# Patient Record
Sex: FEMALE | Race: WHITE
[De-identification: names, ages, dates, MRNs, and addresses within clinical notes are randomized per-mention and may not be internally consistent; named-entity substitution may affect disease eponyms.]

---

## 2018-05-07 ENCOUNTER — HOSPITAL ENCOUNTER (EMERGENCY)
Dept: HOSPITAL 25 - ED | Age: 83
LOS: 1 days | Discharge: HOME | End: 2018-05-08
Payer: MEDICARE

## 2018-05-07 DIAGNOSIS — Z87.891: ICD-10-CM

## 2018-05-07 DIAGNOSIS — J00: Primary | ICD-10-CM

## 2018-05-07 DIAGNOSIS — R05: ICD-10-CM

## 2018-05-07 DIAGNOSIS — R09.81: ICD-10-CM

## 2018-05-07 DIAGNOSIS — R06.02: ICD-10-CM

## 2018-05-07 PROCEDURE — 93005 ELECTROCARDIOGRAM TRACING: CPT

## 2018-05-07 PROCEDURE — 99283 EMERGENCY DEPT VISIT LOW MDM: CPT

## 2018-05-07 PROCEDURE — 71046 X-RAY EXAM CHEST 2 VIEWS: CPT

## 2018-05-07 PROCEDURE — 85610 PROTHROMBIN TIME: CPT

## 2018-05-07 PROCEDURE — 83880 ASSAY OF NATRIURETIC PEPTIDE: CPT

## 2018-05-07 PROCEDURE — 36415 COLL VENOUS BLD VENIPUNCTURE: CPT

## 2018-05-07 PROCEDURE — 80053 COMPREHEN METABOLIC PANEL: CPT

## 2018-05-07 PROCEDURE — 85025 COMPLETE CBC W/AUTO DIFF WBC: CPT

## 2018-05-07 PROCEDURE — 86140 C-REACTIVE PROTEIN: CPT

## 2018-05-07 PROCEDURE — 87502 INFLUENZA DNA AMP PROBE: CPT

## 2018-05-07 PROCEDURE — 84484 ASSAY OF TROPONIN QUANT: CPT

## 2018-05-07 PROCEDURE — 85730 THROMBOPLASTIN TIME PARTIAL: CPT

## 2018-05-07 PROCEDURE — 87040 BLOOD CULTURE FOR BACTERIA: CPT

## 2018-05-08 VITALS — DIASTOLIC BLOOD PRESSURE: 71 MMHG | SYSTOLIC BLOOD PRESSURE: 148 MMHG

## 2018-05-08 LAB
BASOPHILS # BLD AUTO: 0.1 10^3/UL (ref 0–0.2)
EOSINOPHIL # BLD AUTO: 0.3 10^3/UL (ref 0–0.6)
HCT VFR BLD AUTO: 34 % (ref 35–47)
HGB BLD-MCNC: 10.9 G/DL (ref 12–16)
INR PPP/BLD: 2.44 (ref 0.77–1.02)
LYMPHOCYTES # BLD AUTO: 3.4 10^3/UL (ref 1–4.8)
MCH RBC QN AUTO: 26 PG (ref 27–31)
MCHC RBC AUTO-ENTMCNC: 32 G/DL (ref 31–36)
MCV RBC AUTO: 80 FL (ref 80–97)
MONOCYTES # BLD AUTO: 0.9 10^3/UL (ref 0–0.8)
NEUTROPHILS # BLD AUTO: 4.4 10^3/UL (ref 1.5–7.7)
NRBC # BLD AUTO: 0 10^3/UL
NRBC BLD QL AUTO: 0
PLATELET # BLD AUTO: 243 10^3/UL (ref 150–450)
RBC # BLD AUTO: 4.26 10^6/UL (ref 4–5.4)
WBC # BLD AUTO: 9.1 10^3/UL (ref 3.5–10.8)

## 2018-05-08 NOTE — RAD
INDICATION: Cough and congestion



COMPARISON: Chest x-ray findings January 06, 2016



TECHNIQUE: PA and lateral views of the chest were obtained.



FINDINGS:



The heart and mediastinum are normal in size and contour. There is mild calcified

atherosclerosis overlying the arch of the aorta.



The lungs are grossly clear. There is no evidence of large pleural effusion.



The lateral view radiograph the vertebral bodies appear to be diffusely demineralized.



There is no radiographic evidence of free air beneath the diaphragm



IMPRESSION: 

No radiographic evidence of acute cardiopulmonary disease.

## 2018-05-08 NOTE — ED
Xavier NORWOOD Rebecca, scribed for Zarina Trimble MD on 05/08/18 at 0100 .





Shortness of Breath





- HPI Summary


HPI Summary: 


Pt is an 85 y/o F who presents to ED c/o SOB with congestion and cough. Sx have 

been present for about 2 days. Sx aggravated and alleviated by nothing. Denies 

fever. Reports that her  recently had the flu. Pt received her flu 

vaccination this year. Is on Xarelto. 





- History of Current Complaint


Chief Complaint: EDShortnessOfBreath


Time Seen by Provider: 05/08/18 00:47


Hx Obtained From: Patient


Onset/Duration: Lasting Days - 2 days, Still Present


Dyspnea At: Rest


Aggrevating Factors: Nothing


Alleviating Factors: Nothing


Associated Signs & Symptoms: Cough (Nonproductive), Nasal Congestion





- Allergy/Home Medications


Allergies/Adverse Reactions: 


 Allergies











Allergy/AdvReac Type Severity Reaction Status Date / Time


 


erythromycin base Allergy  Nausea And Verified 05/07/18 23:32





   Vomiting  


 


Sulfa (Sulfonamide Allergy  Nausea And Verified 05/07/18 23:32





Antibiotics)   Vomiting  














PMH/Surg Hx/FS Hx/Imm Hx


Endocrine/Hematology History: 


   Denies: Hx Diabetes, Hx Thyroid Disease, Hx Anemia


Cardiovascular History: Reports: Hx Hypertension, Other Cardiovascular Problems/

Disorders


Respiratory History: 


   Denies: Hx Asthma, Hx Chronic Obstructive Pulmonary Disease (COPD)


GI History: Reports: Hx Gastroesophageal Reflux Disease, Other GI Disorders - 

morning nausea r/t medications


   Denies: Hx Jaundice, Hx Ulcer


Sensory History: Reports: Hx Contacts or Glasses - reading glasse


Opthamlomology History: Reports: Hx Contacts or Glasses - reading glasse


Neurological History: Reports: Hx Transient Ischemic Attacks (TIA) - 2004





- Surgical History


Surgery Procedure, Year, and Place: hysterectomy.  laparoscopic surgeries for 

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  abcessed ovary-removed.  surgery for a fib


Infectious Disease History: No


Infectious Disease History: 


   Denies: Hx Clostridium Difficile, Hx Hepatitis, Hx Human Immunodeficiency 

Virus (HIV), Hx of Known/Suspected MRSA, Hx Shingles, Hx Tuberculosis, Hx Known/

Suspected VRE, Hx Known/Suspected VRSA, History Other Infectious Disease, 

Traveled Outside the [place] in Last 30 Days





- Family History


Known Family History: Positive: Cardiac Disease





- Social History


Alcohol Use: Occasionally


Substance Use Type: Reports: Marijuana


Smoking Status (MU): Former Smoker


Type: Cigarettes


Amount Used/How Often: 40 years ago


Have You Smoked in the Last Year: No





Review of Systems


Negative: Fever


Positive: Other - Congestion


Positive: Shortness Of Breath, Cough


All Other Systems Reviewed And Are Negative: Yes





Physical Exam





- Summary


Physical Exam Summary: 


VITAL SIGNS: Reviewed.


GENERAL: ~Patient is a well-developed and nourished female who is lying 

comfortable in the stretcher. Patient is not in any acute respiratory distress.


HEAD AND FACE: No signs of trauma. No ecchymosis, hematomas or skull 

depressions. No sinus tenderness.


EYES: PERRLA, EOMI x 2, No injected conjunctiva, no nystagmus.


EARS: Hearing grossly intact. Ear canals and tympanic membranes are within 

normal limits.


MOUTH: Oropharynx within normal limits.


NECK: Supple, trachea is midline, no adenopathy, no JVD, no carotid bruit, no c-

spine tenderness, neck with full ROM.


CHEST: Symmetric, no tenderness at palpation


LUNGS: Expiratory wheezes bilaterally. No crackles.


CVS: Regular rate and rhythm, S1 and S2 present, no murmurs or gallops 

appreciated.


EXTREMITIES: FROM in all major joints, no edema, no cyanosis or clubbing.


NEURO: Alert and oriented x 3. No acute neurological deficits. Speech is normal 

and follows commands.


SKIN: Dry and warm


Triage Information Reviewed: Yes


Vital Signs On Initial Exam: 


 Initial Vitals











Temp Pulse Resp BP Pulse Ox


 


 98.4 F   75   20   170/82   97 


 


 05/07/18 23:24  05/07/18 23:24  05/07/18 23:24  05/07/18 23:24  05/07/18 23:24











Vital Signs Reviewed: Yes





Diagnostics





- Vital Signs


 Vital Signs











  Temp Pulse Resp BP Pulse Ox


 


 05/07/18 23:24  98.4 F  75  20  170/82  97














- Laboratory


Result Diagrams: 


 05/08/18 01:15





 05/08/18 01:15


Lab Statement: Any lab studies that have been ordered have been reviewed, and 

results considered in the medical decision making process.





- Radiology


  ** CXR


Xray Interpretation: No Acute Changes - No acute process. Pending official 

report.


Radiology Interpretation Completed By: ED Physician





- EKG


  ** 0126


Cardiac Rate: NL - 75 bpm


EKG Rhythm: Sinus Rhythm


EKG Interpretation: Normal axis. Normal interval. No ischemic changes. 





Re-Evaluation





- Re-Evaluation


  ** First Eval


Re-Evaluation Time: 03:11


Change: Improved


Comment: Pt is feeling better. To auscultation, her lungs are clear.





Course/Dx





- Course


Assessment/Plan: Pt is an 85 y/o F who presents to ED c/o SOB with congestion 

and cough. Sx have been present for about 2 days. Sx aggravated and alleviated 

by nothing. Denies fever. Reports that her  recently had the flu. Pt 

received her flu vaccination this year. Is on Xarelto. CXR reveals no acute 

findings. EKG is sinus rhythm with no ischemic changes. Blood work was done. 

Influenza A and B were negative. In the ED course, pt received a Duoneb. Pt 

will be D/C to home with Dx of common cold.  Allergies noted.





- Diagnoses


Provider Diagnoses: 


 Common cold








Discharge





- Sign-Out/Discharge


Documenting (check all that apply): Discharge/Admit/Transfer - DIscharge





- Discharge Plan


Condition: Stable


Disposition: HOME


Patient Education Materials:  Cold Symptoms (ED)


Referrals: 


Evan Naidu MD [Primary Care Provider] - 3 Days


Additional Instructions: 


RETURN TO EMERGENCY DEPARTMENT FOR ANY NEW OR WORSENING SYMPTOMS. 





The documentation as recorded by the Xavier hamilton Rebecca accurately 

reflects the service I personally performed and the decisions made by me, Zarina Trimble MD.

## 2018-05-14 ENCOUNTER — HOSPITAL ENCOUNTER (INPATIENT)
Dept: HOSPITAL 25 - ED | Age: 83
LOS: 4 days | Discharge: HOME | DRG: 871 | End: 2018-05-18
Attending: INTERNAL MEDICINE | Admitting: HOSPITALIST
Payer: MEDICARE

## 2018-05-14 DIAGNOSIS — E78.5: ICD-10-CM

## 2018-05-14 DIAGNOSIS — Z90.710: ICD-10-CM

## 2018-05-14 DIAGNOSIS — R40.2142: ICD-10-CM

## 2018-05-14 DIAGNOSIS — R79.89: ICD-10-CM

## 2018-05-14 DIAGNOSIS — Z88.2: ICD-10-CM

## 2018-05-14 DIAGNOSIS — F12.90: ICD-10-CM

## 2018-05-14 DIAGNOSIS — K21.9: ICD-10-CM
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
## 2018-05-14 DIAGNOSIS — A41.9: Primary | ICD-10-CM
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
## 2018-05-14 DIAGNOSIS — Z86.73: ICD-10-CM

## 2018-05-14 DIAGNOSIS — I10: ICD-10-CM

## 2018-05-14 DIAGNOSIS — R40.2252: ICD-10-CM

## 2018-05-14 DIAGNOSIS — J18.9: ICD-10-CM

## 2018-05-14 DIAGNOSIS — S00.83XA: ICD-10-CM

## 2018-05-14 DIAGNOSIS — Z79.01: ICD-10-CM

## 2018-05-14 DIAGNOSIS — Z87.891: ICD-10-CM

## 2018-05-14 DIAGNOSIS — Z72.89: ICD-10-CM

## 2018-05-14 DIAGNOSIS — Z88.1: ICD-10-CM

## 2018-05-14 DIAGNOSIS — I48.91: ICD-10-CM

## 2018-05-14 DIAGNOSIS — Z82.49: ICD-10-CM

## 2018-05-14 DIAGNOSIS — R40.2362: ICD-10-CM

## 2018-05-14 LAB
BASOPHILS # BLD AUTO: 0.1 10^3/UL (ref 0–0.2)
EOSINOPHIL # BLD AUTO: 0.1 10^3/UL (ref 0–0.6)
HCT VFR BLD AUTO: 35 % (ref 35–47)
HGB BLD-MCNC: 11.4 G/DL (ref 12–16)
INR PPP/BLD: 2.4 (ref 0.77–1.02)
LYMPHOCYTES # BLD AUTO: 1.9 10^3/UL (ref 1–4.8)
MCH RBC QN AUTO: 25 PG (ref 27–31)
MCHC RBC AUTO-ENTMCNC: 33 G/DL (ref 31–36)
MCV RBC AUTO: 77 FL (ref 80–97)
MONOCYTES # BLD AUTO: 1.2 10^3/UL (ref 0–0.8)
NEUTROPHILS # BLD AUTO: 18.3 10^3/UL (ref 1.5–7.7)
NRBC # BLD AUTO: 0 10^3/UL
NRBC BLD QL AUTO: 0
PLATELET # BLD AUTO: 270 10^3/UL (ref 150–450)
RBC # BLD AUTO: 4.49 10^6/UL (ref 4–5.4)
WBC # BLD AUTO: 21.6 10^3/UL (ref 3.5–10.8)

## 2018-05-14 PROCEDURE — 87502 INFLUENZA DNA AMP PROBE: CPT

## 2018-05-14 PROCEDURE — 71046 X-RAY EXAM CHEST 2 VIEWS: CPT

## 2018-05-14 PROCEDURE — 70450 CT HEAD/BRAIN W/O DYE: CPT

## 2018-05-14 PROCEDURE — 83690 ASSAY OF LIPASE: CPT

## 2018-05-14 PROCEDURE — 85730 THROMBOPLASTIN TIME PARTIAL: CPT

## 2018-05-14 PROCEDURE — 80048 BASIC METABOLIC PNL TOTAL CA: CPT

## 2018-05-14 PROCEDURE — 81015 MICROSCOPIC EXAM OF URINE: CPT

## 2018-05-14 PROCEDURE — 87899 AGENT NOS ASSAY W/OPTIC: CPT

## 2018-05-14 PROCEDURE — 86140 C-REACTIVE PROTEIN: CPT

## 2018-05-14 PROCEDURE — 93005 ELECTROCARDIOGRAM TRACING: CPT

## 2018-05-14 PROCEDURE — 87040 BLOOD CULTURE FOR BACTERIA: CPT

## 2018-05-14 PROCEDURE — 84443 ASSAY THYROID STIM HORMONE: CPT

## 2018-05-14 PROCEDURE — 82553 CREATINE MB FRACTION: CPT

## 2018-05-14 PROCEDURE — 83735 ASSAY OF MAGNESIUM: CPT

## 2018-05-14 PROCEDURE — 81003 URINALYSIS AUTO W/O SCOPE: CPT

## 2018-05-14 PROCEDURE — 99285 EMERGENCY DEPT VISIT HI MDM: CPT

## 2018-05-14 PROCEDURE — 87086 URINE CULTURE/COLONY COUNT: CPT

## 2018-05-14 PROCEDURE — 84484 ASSAY OF TROPONIN QUANT: CPT

## 2018-05-14 PROCEDURE — 71045 X-RAY EXAM CHEST 1 VIEW: CPT

## 2018-05-14 PROCEDURE — 82550 ASSAY OF CK (CPK): CPT

## 2018-05-14 PROCEDURE — 84145 PROCALCITONIN (PCT): CPT

## 2018-05-14 PROCEDURE — 87205 SMEAR GRAM STAIN: CPT

## 2018-05-14 PROCEDURE — 83880 ASSAY OF NATRIURETIC PEPTIDE: CPT

## 2018-05-14 PROCEDURE — 36415 COLL VENOUS BLD VENIPUNCTURE: CPT

## 2018-05-14 PROCEDURE — 80053 COMPREHEN METABOLIC PANEL: CPT

## 2018-05-14 PROCEDURE — 83605 ASSAY OF LACTIC ACID: CPT

## 2018-05-14 PROCEDURE — 85610 PROTHROMBIN TIME: CPT

## 2018-05-14 PROCEDURE — 85025 COMPLETE CBC W/AUTO DIFF WBC: CPT

## 2018-05-14 PROCEDURE — 87070 CULTURE OTHR SPECIMN AEROBIC: CPT

## 2018-05-14 RX ADMIN — ACETAMINOPHEN PRN MG: 325 TABLET ORAL at 15:38

## 2018-05-14 RX ADMIN — SODIUM CHLORIDE SCH MLS/HR: 900 IRRIGANT IRRIGATION at 16:21

## 2018-05-14 RX ADMIN — DOCUSATE SODIUM SCH MG: 100 CAPSULE, LIQUID FILLED ORAL at 21:38

## 2018-05-14 RX ADMIN — GUAIFENESIN SCH MG: 600 TABLET, EXTENDED RELEASE ORAL at 21:39

## 2018-05-14 RX ADMIN — SODIUM CHLORIDE SCH MLS/HR: 900 IRRIGANT IRRIGATION at 19:14

## 2018-05-14 RX ADMIN — LOSARTAN POTASSIUM SCH MG: 25 TABLET, FILM COATED ORAL at 21:39

## 2018-05-14 RX ADMIN — RIVAROXABAN SCH MG: 20 TABLET, FILM COATED ORAL at 21:38

## 2018-05-14 NOTE — HP
CC:  Dr. Evan Naidu *

 

ADMISSION HISTORY AND PHYSICAL:

 

DATE OF ADMISSION:  05/14/18

 

ATTENDING HOSPITALIST:  Aicha Mejia DO * (DICTATED BY JACINTO COCHRAN)

 

PRIMARY CARE PHYSICIAN:  Evan Naidu MD

 

CHIEF COMPLAINT:  Shortness of breath.

 

HISTORY OF PRESENT ILLNESS:  Ms. Arroyo is a pleasant 86-year-old female, who 
presented to the emergency room today with complaints of progressive cough and 
shortness of breath for the past 7 days.  The patient has history of 
hypertension, hyperlipidemia, remote history of stroke as well as history of 
atrial fibrillation for which she has been on Xarelto and she is status post 
ablation about 6 years ago.  She was at her usual state of health until about a 
week ago when she started to feel progressive cough and shortness of breath.  
She went to see her primary care physician last Thursday and her exam, as she 
was told, was normal.  She returned home and continued to have progressive 
cough and shortness of breath with some productive sputum.  She unfortunately 
got a little dizzy and fell at home on Saturday night hitting her head and 
reports loss of consciousness for an undetermined period of time.  The patient, 
however, was able to get up and denied any headache, dizziness, weakness, or 
numbness since then.  She continued to have worsening shortness of breath for 
which she finally presented to the emergency room this morning for evaluation.  
She denies any chest pain, dizziness, or weakness today.  She had laboratory 
workup that revealed significant leukocytosis with white count of 21,000 as 
well as a chemistry panel showing elevated C-reactive protein of 246.  The 
patient had CT scan of the brain that revealed no evidence of hemorrhage and 
she had a chest x-ray that was consistent with pneumonia.  The patient herself 
denies any fever, but she notes some chills in the past couple of days.  She 
denies any wheezing or any history of COPD.  Given her presentation and the 
findings of the chest x-ray and laboratory workup, we were asked to see the 
patient for further evaluation of community-acquired pneumonia.

 

PAST MEDICAL HISTORY:  Significant for hypertension, hyperlipidemia, history of 
stroke about 6 years ago, history of atrial fibrillation for which she has been 
on Xarelto and also had ablation about 6 years ago.

 

PAST SURGICAL HISTORY:  Significant for fertility surgery x2 as well as ovarian 
abscess drainage in the remote past.

 

CURRENT MEDICATIONS:  Her medications at home include only:

1.  Losartan 50 mg p.o. b.i.d.

2.  Xarelto 20 mg p.o. daily.

 

ALLERGIES:  She is allergic to ERYTHROMYCIN BASE and SULFONAMIDE ANTIBIOTICS.

 

FAMILY HISTORY:  Reviewed and noncontributory.

 

SOCIAL HISTORY:  The patient denies smoking.  She drinks a half glass to a 
glass of wine on a nightly basis.  She smokes marijuana occasionally.  She 
lives with her , Jacob, who is the healthcare proxy carrier.  She still 
enjoys painting and being active outdoors.

 

REVIEW OF SYSTEMS:  As above.  Otherwise, all 14 systems reviewed and they were 
all negative.

 

                               PHYSICAL EXAMINATION

 

GENERAL:  She is a pleasant, elderly female, in no acute distress or discomfort 
at the time of admission.

 

VITAL SIGNS:  Most recent set of vitals, blood pressure of 120/56, pulse of 89, 
temperature of 98.6, and O2 sat of 95% on room air.

 

HEENT:  Head exam revealed an area of ecchymosis at the right frontal part that 
was nontender on palpation.  There is very minimal area of ecchymosis next to 
the nasal septum between the nasolacrimal fold and upper lid.  There was no 
tenderness or any evidence of swelling either.  Sclerae anicteric.  PERRLA, 
EOMs intact.  Oropharynx is pink, moist with no exudate.

 

LUNGS:  There are scattered expiratory rhonchi and wheezes noted, more profound 
at the bases.  There is no chest wall retraction or accessory muscle use.

 

HEART:  Regular rate and rhythm.  Normal S1 and S2 without rubs, murmurs, or 
gallops.

 

BACK:  With normal curvature.  No CVA tenderness.

 

BREASTS:  Exam deferred at this time.

 

ABDOMEN:  Soft, nontender, and nondistended.  No hernias, masses, or 
hepatosplenomegaly.

 

EXTREMITIES:  Without cyanosis, clubbing, or edema.

 

NEUROLOGIC:  Grossly intact.

 

RECTAL:  Exam deferred at this time.

 

 LABORATORY DATA:  The patient had CBC today that revealed elevated white count 
that was 21,600, hemoglobin of 11.4, hematocrit 35, and platelets 270.  Her INR 
was 2.4.  Chemistry panel:  Sodium of 130, potassium 4.4, chloride 97, CO2 of 21
, BUN 18, and creatinine of 0.8.  Her total bilirubin slightly elevated at 1.1.
  C- reactive protein elevated with value of 246.  .  Lipase less than 
10.  She had rapid influenza A and B antigen that were both negative and urine 
legionella and Strep pneumo antigens are pending at this time.

 

ACCESSORY DIAGNOSTIC DATA:  Chest x-ray this morning was consistent with right 
basilar infiltrate and pneumonia.

 

The patient also had brain CT given her history of fall with head injury.  
There was no acute intracranial pathology noted.

 

IMPRESSION:  An 86-year-old female with past medical history of hypertension, 
hyperlipidemia, cerebrovascular accident, and atrial fibrillation for which she 
has been on Xarelto, who presented to the emergency room with 1 week history of 
progressively worsening cough and shortness of breath, who was found to have 
community-acquired pneumonia.

 

ASSESSMENT AND PLAN:

1.  Sepsis.  The patient has leukocytosis greater than 12,000 as well as C-
reactive protein greater than 150 combined with tachypnea and she meets the 
criteria for sepsis for which she will be admitted for IV hydration and IV 
antibiotics.  We will keep her in the telemetry unit with supportive care and 
supplemental oxygen.  We will initiate antibiotic therapy using a quinolone at 
this time.  We reviewed the benefit of steroid therapy in her case and given 
her age, it was felt that there are more risks involved to initiate steroid 
therapy and for this time, we will continue antibiotic alone.

2.  Syncope and head injury, likely secondary to weakness given her current 
presentation with pneumonia.  There was no evidence of neurologic deficits and 
her CT scan of the head showed no evidence of hematoma either.  We will 
continue her Xarelto given her history of atrial fibrillation.

3.  Atrial fibrillation.  Has been stable and we will continue Xarelto.

4.  Hypertension, controlled.  We will continue her losartan.

5.  Elevated BNP.  In the setting of sepsis, we will not proceed with any 
diuretics at this time.

6.  Marijuana use.  The patient is aware of the risks and benefits involved and 
she would like to continue using it on a recreational basis.

7.  DVT prophylaxis.  The patient on Xarelto.

8.  Code status.  She is a full code.

 

TIME SPENT:  Total time spent admitting this patient was approximately 60 
minutes for which greater than 50% was spent fact-to-face care.

 

 ____________________________________ JACINTO COCHRAN

 

[id number]/CPS #: [id number]

RILEY

## 2018-05-14 NOTE — ED
Gustavo NORWOOD Thomas, scribed for Leroy Carroll MD on 05/14/18 at 1050 .





Complex/Multi-Sys Presentation





- HPI Summary


HPI Summary: 





The patient is an 86 year old female who had a fall five days ago after a 

syncopal episode with positive loss of consciousness. She went to the ED after 

that fall and she went to her primary care provider three days ago. The last 

two days, the patient developed generalized weakness, fevers, and a productive 

cough. She reports worsening of her breathing when lying down. The patient 

denies nausea and focal weakness or numbness. 





- History Of Current Complaint


Chief Complaint: EDGeneral


Time Seen by Provider: 05/14/18 10:38


Hx Obtained From: Patient


Onset/Duration: Still Present


Timing: Constant


Severity Currently: Moderate


Location: Negative


Aggravating Factor(s): Lying down


Alleviating Factor(s): None


Associated Signs And Symptoms: Positive: Other - Syncope, LOC, weakness, fevers

, cough





- Allergies/Home Medications


Allergies/Adverse Reactions: 


 Allergies











Allergy/AdvReac Type Severity Reaction Status Date / Time


 


erythromycin base Allergy  Nausea And Verified 05/07/18 23:32





   Vomiting  


 


Sulfa (Sulfonamide Allergy  Nausea And Verified 05/07/18 23:32





Antibiotics)   Vomiting  














PMH/Surg Hx/FS Hx/Imm Hx


Endocrine/Hematology History: 


   Denies: Hx Diabetes, Hx Thyroid Disease, Hx Anemia


Cardiovascular History: Reports: Hx Hypertension, Other Cardiovascular Problems/

Disorders


Respiratory History: 


   Denies: Hx Asthma, Hx Chronic Obstructive Pulmonary Disease (COPD)


GI History: Reports: Hx Gastroesophageal Reflux Disease, Other GI Disorders - 

morning nausea r/t medications


   Denies: Hx Jaundice, Hx Ulcer


Sensory History: Reports: Hx Contacts or Glasses - reading glasse


Opthamlomology History: Reports: Hx Contacts or Glasses - reading glasse


Neurological History: Reports: Hx Transient Ischemic Attacks (TIA) - 2004





- Surgical History


Surgery Procedure, Year, and Place: hysterectomy.  laparoscopic surgeries for 

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  abcessed ovary-removed.  surgery for a fib


Infectious Disease History: No


Infectious Disease History: 


   Denies: Hx Clostridium Difficile, Hx Hepatitis, Hx Human Immunodeficiency 

Virus (HIV), Hx of Known/Suspected MRSA, Hx Shingles, Hx Tuberculosis, Hx Known/

Suspected VRE, Hx Known/Suspected VRSA, History Other Infectious Disease, 

Traveled Outside the [place] in Last 30 Days





- Family History


Known Family History: Positive: Cardiac Disease





- Social History


Alcohol Use: Occasionally


Substance Use Type: Reports: Marijuana


Substance Use Comment - Amount & Last Used: wknd


Smoking Status (MU): Former Smoker


Type: Cigarettes


Amount Used/How Often: 40 years ago


Have You Smoked in the Last Year: No





Review of Systems


Positive: Fever


Positive: Cough


Neurological: Other - Syncope, LOC


Positive: Weakness - generalized.  Negative: Numbness


All Other Systems Reviewed And Are Negative: Yes





Physical Exam





- Summary


Physical Exam Summary: 





General: well-appearing, no pain distress


Skin: warm, color reflects adequate perfusion, dry


Head: She has swelling to her right forehead. 


Eyes: EOMI, YOSI


ENT: Dry oral mucosa


Neck: supple, nontender


Respiratory: Bilateral wheezing and rhonchi, minimal respiratory distress. 


Cardiovascular: Tachycardia, regular rhythm. 


Abdomen: soft, nontender


Bowel: present


Musculoskeletal: Normal, strength/ROM intact. There is not any edema. 


Neurological: normal, sensory/motor intact, A&O x3


Psychological: affect/mood appropriate


Triage Information Reviewed: Yes


Vital Signs On Initial Exam: 


 Initial Vitals











Temp Pulse Resp BP Pulse Ox


 


 98.6 F   116   25   147/85   91 


 


 05/14/18 10:22  05/14/18 10:22  05/14/18 10:22  05/14/18 10:22  05/14/18 10:22











Vital Signs Reviewed: Yes





- Fabrice Coma Scale


Best Eye Response: 4 - Spontaneous


Best Motor Response: 6 - Obeys Commands


Best Verbal Response: 5 - Oriented


Coma Scale Total: 15





Diagnostics





- Vital Signs


 Vital Signs











  Temp Pulse Resp BP Pulse Ox


 


 05/14/18 10:27   114  22  147/85  91


 


 05/14/18 10:22  98.6 F  116  25  147/85  91














- Laboratory


Lab Results: 


 Lab Results











  05/14/18 05/14/18 05/14/18 Range/Units





  10:49 10:49 10:49 


 


WBC  21.6 H    (3.5-10.8)  10^3/ul


 


RBC  4.49    (4.0-5.4)  10^6/ul


 


Hgb  11.4 L    (12.0-16.0)  g/dl


 


Hct  35    (35-47)  %


 


MCV  77 L    (80-97)  fL


 


MCH  25 L    (27-31)  pg


 


MCHC  33    (31-36)  g/dl


 


RDW  17 H    (10.5-15)  %


 


Plt Count  270    (150-450)  10^3/ul


 


MPV  9.0    (7.4-10.4)  um3


 


Neut % (Auto)  Pending    


 


Lymph % (Auto)  Pending    


 


Mono % (Auto)  Pending    


 


Eos % (Auto)  Pending    


 


Baso % (Auto)  Pending    


 


Absolute Neuts (auto)  Pending    


 


Absolute Lymphs (auto)  Pending    


 


Absolute Monos (auto)  Pending    


 


Absolute Eos (auto)  Pending    


 


Absolute Basos (auto)  Pending    


 


Absolute Nucleated RBC  Pending    


 


Nucleated RBC %  Pending    


 


INR (Anticoag Therapy)   2.40 H   (0.77-1.02)  


 


APTT   32.6   (26.0-36.3)  seconds


 


Sodium    130 L  (139-145)  mmol/L


 


Potassium    4.4  (3.5-5.0)  mmol/L


 


Chloride    97 L  (101-111)  mmol/L


 


Carbon Dioxide    21 L  (22-32)  mmol/L


 


Anion Gap    12 H  (2-11)  mmol/L


 


BUN    18  (6-24)  mg/dL


 


Creatinine    0.81  (0.51-0.95)  mg/dL


 


Est GFR ( Amer)    86.2  (>60)  


 


Est GFR (Non-Af Amer)    67.0  (>60)  


 


BUN/Creatinine Ratio    22.2 H  (8-20)  


 


Glucose    137 H  ()  mg/dL


 


Lactic Acid     (0.5-2.0)  mmol/L


 


Calcium    9.4  (8.6-10.3)  mg/dL


 


Magnesium    2.0  (1.9-2.7)  mg/dL


 


Total Bilirubin    1.10 H  (0.2-1.0)  mg/dL


 


AST    50 H  (13-39)  U/L


 


ALT    61 H  (7-52)  U/L


 


Alkaline Phosphatase    139 H  ()  U/L


 


Total Creatine Kinase    28  ()  U/L


 


CK-MB (CK-2)    1.0  (0.6-6.3)  ng/mL


 


Troponin I    0.02  (<0.04)  ng/mL


 


C-Reactive Protein    246.42 H  (< 5.00)  mg/L


 


B-Natriuretic Peptide    ( - 100) pg/mL


 


Total Protein    6.9  (6.4-8.9)  g/dL


 


Albumin    3.8  (3.2-5.2)  g/dL


 


Globulin    3.1  (2-4)  g/dL


 


Albumin/Globulin Ratio    1.2  (1-3)  


 


Lipase    < 10 L  (11.0-82.0)  U/L


 


TSH    2.05  (0.34-5.60)  mcIU/mL














  05/14/18 05/14/18 Range/Units





  10:49 10:49 


 


WBC    (3.5-10.8)  10^3/ul


 


RBC    (4.0-5.4)  10^6/ul


 


Hgb    (12.0-16.0)  g/dl


 


Hct    (35-47)  %


 


MCV    (80-97)  fL


 


MCH    (27-31)  pg


 


MCHC    (31-36)  g/dl


 


RDW    (10.5-15)  %


 


Plt Count    (150-450)  10^3/ul


 


MPV    (7.4-10.4)  um3


 


Neut % (Auto)    


 


Lymph % (Auto)    


 


Mono % (Auto)    


 


Eos % (Auto)    


 


Baso % (Auto)    


 


Absolute Neuts (auto)    


 


Absolute Lymphs (auto)    


 


Absolute Monos (auto)    


 


Absolute Eos (auto)    


 


Absolute Basos (auto)    


 


Absolute Nucleated RBC    


 


Nucleated RBC %    


 


INR (Anticoag Therapy)    (0.77-1.02)  


 


APTT    (26.0-36.3)  seconds


 


Sodium    (139-145)  mmol/L


 


Potassium    (3.5-5.0)  mmol/L


 


Chloride    (101-111)  mmol/L


 


Carbon Dioxide    (22-32)  mmol/L


 


Anion Gap    (2-11)  mmol/L


 


BUN    (6-24)  mg/dL


 


Creatinine    (0.51-0.95)  mg/dL


 


Est GFR ( Amer)    (>60)  


 


Est GFR (Non-Af Amer)    (>60)  


 


BUN/Creatinine Ratio    (8-20)  


 


Glucose    ()  mg/dL


 


Lactic Acid  0.5   (0.5-2.0)  mmol/L


 


Calcium    (8.6-10.3)  mg/dL


 


Magnesium    (1.9-2.7)  mg/dL


 


Total Bilirubin    (0.2-1.0)  mg/dL


 


AST    (13-39)  U/L


 


ALT    (7-52)  U/L


 


Alkaline Phosphatase    ()  U/L


 


Total Creatine Kinase    ()  U/L


 


CK-MB (CK-2)    (0.6-6.3)  ng/mL


 


Troponin I    (<0.04)  ng/mL


 


C-Reactive Protein    (< 5.00)  mg/L


 


B-Natriuretic Peptide   332 H ( - 100) pg/mL


 


Total Protein    (6.4-8.9)  g/dL


 


Albumin    (3.2-5.2)  g/dL


 


Globulin    (2-4)  g/dL


 


Albumin/Globulin Ratio    (1-3)  


 


Lipase    (11.0-82.0)  U/L


 


TSH    (0.34-5.60)  mcIU/mL











Result Diagrams: 


 05/14/18 10:49





 05/14/18 10:49


Lab Statement: Any lab studies that have been ordered have been reviewed, and 

results considered in the medical decision making process.





- Radiology


  ** CXR


Xray Interpretation: No Acute Changes - IMPRESSION: SUGGESTION OF RIGHT BASILAR 

INFILTRATE AND PNEUMONIA. Dr. Carroll has reviewed this report.


Radiology Interpretation Completed By: Radiologist





- CT


  ** CT Brain


CT Interpretation: No Acute Changes - IMPRESSION:  1.  NO ACUTE INTRACRANIAL 

PATHOLOGY. 2.  CHRONIC SMALL VESSEL ISCHEMIC CHANGE. 3.  RIGHT MASTOID 

EFFUSION. Dr. Carroll has reviewed this report.


CT Interpretation Completed By: Radiologist





- EKG


  ** 10:32


Cardiac Rate: Tachycardia


EKG Interpretation: Rapid A-Fib at 111 BPM. Borderline T abnormalities, 

inferior leads. 





Complex Multi-Symp Course/Dx


Course Of Treatment: DISCUSSED WITH DR BURKS.  ADMIT HOSPITALIST.  CRITICAL CARE 

TIME LESS THGAN 30 MINUTES.


Assessment/Plan: Medications reviewed. Allergies noted. BP noted and patient 

urged to follow up with primary care.





- Diagnoses


Provider Diagnoses: 


 HTN (hypertension), Pneumonia, Head injury








- Physician Notifications


Discussed Care Of Patient With: Aicha Mejia


Time Discussed With Above Provider: 12:36


Instructed by Provider To: Admit As Inpatient





Discharge





- Sign-Out/Discharge


Documenting (check all that apply): Discharge/Admit/Transfer





- Discharge Plan


Condition: Fair


Disposition: ADMITTED TO [facility]


Referrals: 


Evan Burks MD [Primary Care Provider] - 





- Billing Disposition and Condition


Condition: FAIR


Disposition: [facility]





The documentation as recorded by the Gustavo hamilton Thomas accurately reflects 

the service I personally performed and the decisions made by me, Leroy Carroll MD.

## 2018-05-14 NOTE — RAD
Indication: Weakness.



Single frontal view of the chest performed at 1040 hours was reviewed.



Comparison is made with previous exam dated May 08, 2018.



No mediastinal shift is noted. Airspace disease in the right base not present on previous

exam is present. There is some atelectasis in the left midlung field as well.



IMPRESSION: SUGGESTION OF RIGHT BASILAR INFILTRATE AND PNEUMONIA.

## 2018-05-14 NOTE — RAD
HISTORY: Subacute trauma, anticoagulation



COMPARISONS: September 21, 2015



TECHNIQUE: Multiple contiguous axial CT scans were obtained of the head without 

intravenous contrast. 



FINDINGS: 

HEMORRHAGE/INFARCT: There is no hemorrhage or acute infarct.

MASSES/SHIFT: There is no mass or shift.



EXTRA-AXIAL SPACES: There are no extra-axial fluid collections.

SULCI AND VENTRICLES: The sulci and ventricles are normal in size and position for the

patient's stated age.



CEREBRUM: There is hypoattenuation of the periventricular and subcortical white matter.

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

BRAINSTEM: There are no focal parenchymal abnormalities.

CEREBELLUM: There are no focal parenchymal abnormalities.



VESSELS: There is calcification of the cavernous segments of the internal carotid arteries

bilaterally.

PARANASAL SINUSES: There is mucosal thickening of the ethmoid air cells. There is a right

mastoid effusion.

ORBITS: Senile calcifications are noted.

BONES AND SOFT TISSUE: No bone or soft tissue abnormalities are noted.



OTHER: None



IMPRESSION: 

1.  NO ACUTE INTRACRANIAL PATHOLOGY.

2.  CHRONIC SMALL VESSEL ISCHEMIC CHANGE.

3.  RIGHT MASTOID EFFUSION.

## 2018-05-15 LAB
BASOPHILS # BLD AUTO: 0 10^3/UL (ref 0–0.2)
EOSINOPHIL # BLD AUTO: 0 10^3/UL (ref 0–0.6)
HCT VFR BLD AUTO: 29 % (ref 35–47)
HGB BLD-MCNC: 9.3 G/DL (ref 12–16)
LYMPHOCYTES # BLD AUTO: 2.1 10^3/UL (ref 1–4.8)
MCH RBC QN AUTO: 26 PG (ref 27–31)
MCHC RBC AUTO-ENTMCNC: 33 G/DL (ref 31–36)
MCV RBC AUTO: 79 FL (ref 80–97)
MONOCYTES # BLD AUTO: 1.1 10^3/UL (ref 0–0.8)
NEUTROPHILS # BLD AUTO: 15 10^3/UL (ref 1.5–7.7)
NRBC # BLD AUTO: 0 10^3/UL
NRBC BLD QL AUTO: 0
PLATELET # BLD AUTO: 222 10^3/UL (ref 150–450)
RBC # BLD AUTO: 3.6 10^6/UL (ref 4–5.4)
WBC # BLD AUTO: 18.3 10^3/UL (ref 3.5–10.8)

## 2018-05-15 RX ADMIN — GUAIFENESIN SCH MG: 600 TABLET, EXTENDED RELEASE ORAL at 21:03

## 2018-05-15 RX ADMIN — GUAIFENESIN SCH MG: 600 TABLET, EXTENDED RELEASE ORAL at 08:38

## 2018-05-15 RX ADMIN — RIVAROXABAN SCH MG: 20 TABLET, FILM COATED ORAL at 08:38

## 2018-05-15 RX ADMIN — LOSARTAN POTASSIUM SCH MG: 25 TABLET, FILM COATED ORAL at 08:38

## 2018-05-15 RX ADMIN — LOSARTAN POTASSIUM SCH MG: 25 TABLET, FILM COATED ORAL at 21:05

## 2018-05-15 RX ADMIN — DOCUSATE SODIUM SCH MG: 100 CAPSULE, LIQUID FILLED ORAL at 08:38

## 2018-05-15 RX ADMIN — DOCUSATE SODIUM SCH: 100 CAPSULE, LIQUID FILLED ORAL at 21:05

## 2018-05-15 RX ADMIN — LEVOFLOXACIN SCH MLS/HR: 5 INJECTION, SOLUTION INTRAVENOUS at 11:56

## 2018-05-15 NOTE — RAD
INDICATION: Follow-up right-sided pneumonia



COMPARISON: Most recent comparison chest x-ray is dated May 14, 2018



TECHNIQUE: PA and lateral views of the chest were obtained.



FINDINGS:



The heart and mediastinum are normal in size and contour.



There is right lung base costophrenic angle blunting which is depicted better on the

lateral view chest x-ray. Again seen is questionable density overlying the right lower

lung but this is not well reproduced on the lateral view chest x-ray. More superiorly and

in the left lung the lungs are adequately aerated.



Visualized bones are normal for the patient's age.



There is no radiographic evidence of free air beneath the diaphragm



IMPRESSION: 

LIKELY LEFT-SIDED PLEURAL EFFUSION POSSIBLY WITH A SMALL AMOUNT OF ADJACENT COMPRESSIVE

ATELECTASIS. THERE IS NO DEFINITE LOBAR CONSOLIDATION.

## 2018-05-15 NOTE — PN
Subjective


Date of Service: 05/15/18


Interval History: 





Mrs. masters reports doing much better today. She is breathing better, 

although still has occasional wheezing. Fever had subsided since yesterday 

evening. Appetite is better, denies nausea or vomiting. No chest pain or 

palpitations. Seen earlier today by , her PCP. No new complaints.








Family History: Unchanged from Admission


Social History: Unchanged from Admission


Past Medical History: Unchanged from Admission





Objective


Active Medications: 








Acetaminophen (Tylenol Tab*)  650 mg PO Q4H PRN


   PRN Reason: FEVER/PAIN


   Last Admin: 18 15:38 Dose:  650 mg


Al Hydrox/Mg Hydrox/Simethicone (Maalox Plus*)  30 ml PO Q6H PRN


   PRN Reason: INDIGESTION


Albuterol (Ventolin 2.5 Mg/3 Ml Neb.Sol*)  2.5 mg INH [id number] AWAKE PRN


   PRN Reason: sob/wheezing


Docusate Sodium (Colace Cap*)  100 mg PO BID [facility]


   Last Admin: 05/15/18 08:38 Dose:  100 mg


Guaifenesin (Mucinex*)  600 mg PO BID [facility]


   Last Admin: 05/15/18 08:38 Dose:  600 mg


Levofloxacin/Dextrose (Levaquin 250 Mg Ivpremx(*))  250 mg in 50 mls @ 50 mls/

hr IVPB Q24H [facility]


   Last Admin: 05/15/18 11:56 Dose:  50 mls/hr


Losartan Potassium (Cozaar Tab*)  50 mg PO BID [facility]


   Last Admin: 05/15/18 08:38 Dose:  50 mg


Magnesium Hydroxide (Milk Of Magnesia Liq*)  30 ml PO Q4H PRN


   PRN Reason: CONSTIPATION


Ondansetron HCl (Zofran Inj*)  4 mg IV Q4H PRN


   PRN Reason: NAUSEA/VOMITING


Oxycodone/Acetaminophen (Percocet 5/325 Tab*)  1 tab PO Q4H PRN


   PRN Reason: Pain


Rivaroxaban (Xarelto(*))  20 mg PO DAILY [facility]


   Last Admin: 05/15/18 08:38 Dose:  20 mg








 Vital Signs - 8 hr











  05/15/18





  11:24


 


Temperature 98.3 F


 


Pulse Rate 85


 


Respiratory 24





Rate 


 


Blood Pressure 125/62





(mmHg) 


 


O2 Sat by Pulse 97





Oximetry 











Oxygen Devices in Use Now: Nasal Cannula


Appearance: Appears comfortable, finished eating her dinner, in NAD


Eyes: No Scleral Icterus, PERRLA


Ears/Nose/Mouth/Throat: Clear Oropharnyx, Mucous Membranes Moist


Neck: NL Appearance and Movements; NL JVP, Trachea Midline


Respiratory: Symmetrical Chest Expansion and Respiratory Effort, - - Lungs with 

scattered wheezes noted. Bibasilar rhonchi. Mild accessory muscle use.


Cardiovascular: NL Sounds; No Murmurs; No JVD, - - Irrigularly irrigular rythem


Abdominal: NL Sounds; No Tenderness; No Distention


Lymphatic: No Cervical Adenopathy


Extremities: No Edema


Skin: No Rash or Ulcers


Neurological: Alert and Oriented x 3, NL Sensation, NL Muscle Strength and Tone


Lines/Tubes/Other Access: Clean, Dry and Intact Peripheral IV - IVF stopped


Nutrition: Taking PO's


Result Diagrams: 


 05/15/18 04:53





 05/15/18 04:53


Additional Lab and Data: 


 Lab Results











  18 Range/Units





  10:49 10:49 10:49 


 


WBC  21.6 H    (3.5-10.8)  10^3/ul


 


RBC  4.49    (4.0-5.4)  10^6/ul


 


Hgb  11.4 L    (12.0-16.0)  g/dl


 


Hct  35    (35-47)  %


 


MCV  77 L    (80-97)  fL


 


MCH  25 L    (27-31)  pg


 


MCHC  33    (31-36)  g/dl


 


RDW  17 H    (10.5-15)  %


 


Plt Count  270    (150-450)  10^3/ul


 


MPV  9.0    (7.4-10.4)  um3


 


Neut % (Auto)  Pending    


 


Lymph % (Auto)  Pending    


 


Mono % (Auto)  Pending    


 


Eos % (Auto)  Pending    


 


Baso % (Auto)  Pending    


 


Absolute Neuts (auto)  Pending    


 


Absolute Lymphs (auto)  Pending    


 


Absolute Monos (auto)  Pending    


 


Absolute Eos (auto)  Pending    


 


Absolute Basos (auto)  Pending    


 


Absolute Nucleated RBC  Pending    


 


Nucleated RBC %  Pending    


 


INR (Anticoag Therapy)   2.40 H   (0.77-1.02)  


 


APTT   32.6   (26.0-36.3)  seconds


 


Sodium    130 L  (139-145)  mmol/L


 


Potassium    4.4  (3.5-5.0)  mmol/L


 


Chloride    97 L  (101-111)  mmol/L


 


Carbon Dioxide    21 L  (22-32)  mmol/L


 


Anion Gap    12 H  (2-11)  mmol/L


 


BUN    18  (6-24)  mg/dL


 


Creatinine    0.81  (0.51-0.95)  mg/dL


 


Est GFR ( Amer)    86.2  (>60)  


 


Est GFR (Non-Af Amer)    67.0  (>60)  


 


BUN/Creatinine Ratio    22.2 H  (8-20)  


 


Glucose    137 H  ()  mg/dL


 


Lactic Acid     (0.5-2.0)  mmol/L


 


Calcium    9.4  (8.6-10.3)  mg/dL


 


Magnesium    2.0  (1.9-2.7)  mg/dL


 


Total Bilirubin    1.10 H  (0.2-1.0)  mg/dL


 


AST    50 H  (13-39)  U/L


 


ALT    61 H  (7-52)  U/L


 


Alkaline Phosphatase    139 H  ()  U/L


 


Total Creatine Kinase    28  ()  U/L


 


CK-MB (CK-2)    1.0  (0.6-6.3)  ng/mL


 


Troponin I    0.02  (<0.04)  ng/mL


 


C-Reactive Protein    246.42 H  (< 5.00)  mg/L


 


B-Natriuretic Peptide    ( - 100) pg/mL


 


Total Protein    6.9  (6.4-8.9)  g/dL


 


Albumin    3.8  (3.2-5.2)  g/dL


 


Globulin    3.1  (2-4)  g/dL


 


Albumin/Globulin Ratio    1.2  (1-3)  


 


Lipase    < 10 L  (11.0-82.0)  U/L


 


TSH    2.05  (0.34-5.60)  mcIU/mL














  18 Range/Units





  10:49 10:49 


 


WBC    (3.5-10.8)  10^3/ul


 


RBC    (4.0-5.4)  10^6/ul


 


Hgb    (12.0-16.0)  g/dl


 


Hct    (35-47)  %


 


MCV    (80-97)  fL


 


MCH    (27-31)  pg


 


MCHC    (31-36)  g/dl


 


RDW    (10.5-15)  %


 


Plt Count    (150-450)  10^3/ul


 


MPV    (7.4-10.4)  um3


 


Neut % (Auto)    


 


Lymph % (Auto)    


 


Mono % (Auto)    


 


Eos % (Auto)    


 


Baso % (Auto)    


 


Absolute Neuts (auto)    


 


Absolute Lymphs (auto)    


 


Absolute Monos (auto)    


 


Absolute Eos (auto)    


 


Absolute Basos (auto)    


 


Absolute Nucleated RBC    


 


Nucleated RBC %    


 


INR (Anticoag Therapy)    (0.77-1.02)  


 


APTT    (26.0-36.3)  seconds


 


Sodium    (139-145)  mmol/L


 


Potassium    (3.5-5.0)  mmol/L


 


Chloride    (101-111)  mmol/L


 


Carbon Dioxide    (22-32)  mmol/L


 


Anion Gap    (2-11)  mmol/L


 


BUN    (6-24)  mg/dL


 


Creatinine    (0.51-0.95)  mg/dL


 


Est GFR ( Amer)    (>60)  


 


Est GFR (Non-Af Amer)    (>60)  


 


BUN/Creatinine Ratio    (8-20)  


 


Glucose    ()  mg/dL


 


Lactic Acid  0.5   (0.5-2.0)  mmol/L


 


Calcium    (8.6-10.3)  mg/dL


 


Magnesium    (1.9-2.7)  mg/dL


 


Total Bilirubin    (0.2-1.0)  mg/dL


 


AST    (13-39)  U/L


 


ALT    (7-52)  U/L


 


Alkaline Phosphatase    ()  U/L


 


Total Creatine Kinase    ()  U/L


 


CK-MB (CK-2)    (0.6-6.3)  ng/mL


 


Troponin I    (<0.04)  ng/mL


 


C-Reactive Protein    (< 5.00)  mg/L


 


B-Natriuretic Peptide   332 H ( - 100) pg/mL


 


Total Protein    (6.4-8.9)  g/dL


 


Albumin    (3.2-5.2)  g/dL


 


Globulin    (2-4)  g/dL


 


Albumin/Globulin Ratio    (1-3)  


 


Lipase    (11.0-82.0)  U/L


 


TSH    (0.34-5.60)  mcIU/mL











Microbiology and Other Data: 


 Microbiology











 18 15:59 Urine Culture - Final





 Urine    No Growth (<1,000 CFU/mL)


 


 18 15:59 Legionella Urinary Antigen - Final





 Urine    Negative Legionella Antigen





 Streptococcus pneumoniae Ag Screen - Final





    Negative S. pneumo Antigen


 


 18 16:35 Gram Stain - Final





 Sputum Expectorated 


 


 18 14:13 Influenza Types A,B Antigen ([facility]) - Final





 Nasopharyngeal    Specimen received for Influenza A/B Molecular testing











Diagnostic Imaging: 





Patient Name:         ZEN MASTERS                                         

                        Medical Record#: [id number]


Ordering Physician: Evan Naidu MD                                                

                        Acct.#: [id number]


:     1932         Age: 86   Sex: F                                   

                        Location: [address] [place] [address] MEDICAL/TELEMETRY


Exam Date: 05/15/18 0832                                                       

                        ADM Status: ADM IN


Order Information:                         CHEST PA & LAT 2 VWS


Accession Number:                          [id number]


CPT:                                       [id number]


INDICATION: Follow-up right-sided pneumonia





COMPARISON: Most recent comparison chest x-ray is dated May 14, 2018











IMPRESSION: 


LIKELY LEFT-SIDED PLEURAL EFFUSION POSSIBLY WITH A SMALL AMOUNT OF ADJACENT 

COMPRESSIVE


ATELECTASIS. THERE IS NO DEFINITE LOBAR CONSOLIDATION.





____________________________________________________________


<Electronically signed by Mata Jones MD in OV>  05/15/18 1011


Dictated By: Mata Jones MD














Assess/Plan/Problems-Billing


Assessment: 





An 87 y/o female with PMHx HTN, HLD, CVA, A-fib on Xarelto, who presented to ED 

with a week history of progressively worsening cough and SOB, found to have 

community acquired pneumonia.











- Patient Problems


(1) Sepsis


Current Visit: Yes   Status: Acute   Priority: High   Comment: 


- Patient meets criteria for sepsis with leukocytosis, fever and tachypnea. 


- Continue IV antibiotics


- Improving clinically


- supportive care


- Supplemental oxygen


   





(2) Pneumonia, community acquired


Current Visit: Yes   Status: Acute   Priority: High   Comment: 


- Appears worse clinically than diagnostic imaging.


- Showing clinical improvement


- Continue antibiotics and neb as needed


   





(3) Head trauma


Current Visit: Yes   Status: Acute   Comment: 


- Secondary to fall at home 2 days prior to ED presentation


- Residual right temple hematoma


- stable, no neurologic symptoms


   





(4) Atrial fibrillation


Current Visit: Yes   Status: Acute   Comment: 


- Rate controlled


- On Xarelto


   





(5) HTN (hypertension)


Current Visit: Yes   Status: Chronic   Comment: 


- well controlled


- continue Losartan


   





(6) History of CVA (cerebrovascular accident)


Current Visit: Yes   Status: Chronic   Comment: 


- Stable 


   





(7) Marijuana use


Current Visit: No   Status: Resolved   Comment: 


- Has been using it for years


   





(8) Syncope and collapse


Current Visit: No   Status: Resolved   Comment: 


- Symptoms resolved


- Continue tele monitoring


   





(9) DVT prophylaxis


Current Visit: Yes   Status: Acute   Comment: 


- on Xarelto


   





(10) Full code status


Current Visit: Yes   Status: Acute   


Status and Disposition: 





Inpatient. Anticipate discharge when medically stable.

## 2018-05-16 LAB
BASOPHILS # BLD AUTO: 0.1 10^3/UL (ref 0–0.2)
EOSINOPHIL # BLD AUTO: 0.1 10^3/UL (ref 0–0.6)
HCT VFR BLD AUTO: 30 % (ref 35–47)
HGB BLD-MCNC: 10 G/DL (ref 12–16)
LYMPHOCYTES # BLD AUTO: 3.1 10^3/UL (ref 1–4.8)
MCH RBC QN AUTO: 26 PG (ref 27–31)
MCHC RBC AUTO-ENTMCNC: 33 G/DL (ref 31–36)
MCV RBC AUTO: 78 FL (ref 80–97)
MONOCYTES # BLD AUTO: 1.1 10^3/UL (ref 0–0.8)
NEUTROPHILS # BLD AUTO: 10.8 10^3/UL (ref 1.5–7.7)
NRBC # BLD AUTO: 0 10^3/UL
NRBC BLD QL AUTO: 0.1
PLATELET # BLD AUTO: 287 10^3/UL (ref 150–450)
RBC # BLD AUTO: 3.85 10^6/UL (ref 4–5.4)
WBC # BLD AUTO: 15.2 10^3/UL (ref 3.5–10.8)

## 2018-05-16 RX ADMIN — DOCUSATE SODIUM SCH MG: 100 CAPSULE, LIQUID FILLED ORAL at 19:51

## 2018-05-16 RX ADMIN — GUAIFENESIN SCH MG: 600 TABLET, EXTENDED RELEASE ORAL at 19:51

## 2018-05-16 RX ADMIN — GUAIFENESIN SCH MG: 600 TABLET, EXTENDED RELEASE ORAL at 08:48

## 2018-05-16 RX ADMIN — RIVAROXABAN SCH MG: 20 TABLET, FILM COATED ORAL at 08:48

## 2018-05-16 RX ADMIN — DOCUSATE SODIUM SCH: 100 CAPSULE, LIQUID FILLED ORAL at 08:24

## 2018-05-16 RX ADMIN — LOSARTAN POTASSIUM SCH MG: 25 TABLET, FILM COATED ORAL at 08:47

## 2018-05-16 RX ADMIN — LEVOFLOXACIN SCH MLS/HR: 5 INJECTION, SOLUTION INTRAVENOUS at 12:10

## 2018-05-16 RX ADMIN — LOSARTAN POTASSIUM SCH MG: 25 TABLET, FILM COATED ORAL at 19:50

## 2018-05-16 NOTE — PN
Subjective





- Subjective


Reason for Note: Progress Note


History: 





She continues to have a cough.  She is bringing up yellow sputum which was 

streaked on one occasion yesterday with blood.  When she walks to the bathroom 

she has dyspnea.  She continues to have night sweats and a low grade fever.  

She has no chest pain.


Active Problems: 


 Active Problems





Atrial fibrillation (Acute) I48.91


  - Rate controlled - On Xarelto  


DVT prophylaxis (Acute) [id number]


  - on Xarelto  


Fall (Acute) 


Full code status (Acute) Z78.9


Head contusion (Acute) S00.93XA


Head trauma (Acute) S09.90XA


  - Secondary to fall at home 2 days prior to ED presentation - Residual right 

temple hematoma - stable, no neurologic symptoms  


Pneumonia, community acquired (Acute) J18.9


  - Appears worse clinically than diagnostic imaging. - Showing clinical 

improvement - Continue antibiotics and neb as needed  


Right lower lobe pneumonia (Acute) J18.1


Sepsis (Acute) 


Sepsis (Acute) 


  - Patient meets criteria for sepsis with leukocytosis, fever and tachypnea. - 

Continue IV antibiotics - Improving clinically - supportive care - Supplemental 

oxygen  


GERD (gastroesophageal reflux disease) (Chronic) K21.9


HTN (hypertension) (Chronic) I10


  - well controlled - continue Losartan  


History of CVA (cerebrovascular accident) (Chronic) Z86.73


  - Stable  


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 

Z98.89, Z86.79








Current Medications: 


 Current Medications





Acetaminophen (Tylenol Tab*)  650 mg PO Q4H PRN


   PRN Reason: FEVER/PAIN


   Last Admin: 18 15:38 Dose:  650 mg


Al Hydrox/Mg Hydrox/Simethicone (Maalox Plus*)  30 ml PO Q6H PRN


   PRN Reason: INDIGESTION


Albuterol (Ventolin 2.5 Mg/3 Ml Neb.Sol*)  2.5 mg INH [id number] AWAKE PRN


   PRN Reason: sob/wheezing


Docusate Sodium (Colace Cap*)  100 mg PO BID [facility]


   Last Admin: 05/15/18 21:05 Dose:  Not Given


Guaifenesin (Mucinex*)  600 mg PO BID [facility]


   Last Admin: 05/15/18 21:03 Dose:  600 mg


Levofloxacin/Dextrose (Levaquin 250 Mg Ivpremx(*))  250 mg in 50 mls @ 50 mls/

hr IVPB Q24H [facility]


   Last Admin: 05/15/18 11:56 Dose:  50 mls/hr


Losartan Potassium (Cozaar Tab*)  50 mg PO BID [facility]


   Last Admin: 05/15/18 21:05 Dose:  50 mg


Magnesium Hydroxide (Milk Of Magnesia Liq*)  30 ml PO Q4H PRN


   PRN Reason: CONSTIPATION


Ondansetron HCl (Zofran Inj*)  4 mg IV Q4H PRN


   PRN Reason: NAUSEA/VOMITING


Oxycodone/Acetaminophen (Percocet 5/325 Tab*)  1 tab PO Q4H PRN


   PRN Reason: Pain


Rivaroxaban (Xarelto(*))  20 mg PO DAILY [facility]


   Last Admin: 05/15/18 08:38 Dose:  20 mg











- Review of Systems


Constitutional Symptoms: Yes: Weakness, Fatigue, Fever, Night Sweats


Cardiology: Positive: Shortness of Breath


   Negative: Chest Pain, Palpitations, Swelling of Ankles


Gastroenterology: Positive: Anorexia


   Negative: Abdominal Pain, Nausea, Vomiting, Constipation, Diarrhea


Genital - Urinary: Positive: Dysuria - mild


Home Medications: 


 Home Medications











 Medication  Instructions  Recorded  Confirmed  Type


 


Rivaroxaban TAB(*) [Xarelto (NF)] 20 mg PO DAILY 16 History


 


Losartan TAB* [Cozaar TAB*] 50 mg PO BID 16 History











Allergies: 


 Allergies











Allergy/AdvReac Type Severity Reaction Status Date / Time


 


erythromycin base Allergy  Nausea And Verified 18 23:32





   Vomiting  


 


Sulfa (Sulfonamide Allergy  Nausea And Verified 18 23:32





Antibiotics)   Vomiting  














Objective





- Vital Signs


Vital Signs: 


 Vital Signs











  05/15/18 05/15/18 05/15/18





  07:49 08:00 08:43


 


Temperature 99.3 F  


 


Pulse Rate 87  


 


Respiratory 20 20 





Rate   


 


Blood Pressure 128/58  





(mmHg)   


 


O2 Sat by Pulse 97  99





Oximetry   














  05/15/18 05/15/18 05/15/18





  11:24 15:33 18:24


 


Temperature 98.3 F 98.9 F 


 


Pulse Rate 85 93 85


 


Respiratory 24 16 20





Rate   


 


Blood Pressure 125/62 129/69 





(mmHg)   


 


O2 Sat by Pulse 97 99 97





Oximetry   














  05/15/18 05/15/18 05/15/18





  19:18 20:00 23:48


 


Temperature 99.0 F  99.5 F


 


Pulse Rate 99  100


 


Respiratory 20 20 16





Rate   


 


Blood Pressure 146/71  152/88





(mmHg)   


 


O2 Sat by Pulse 98  97





Oximetry   














  18





  03:47


 


Temperature 98.8 F


 


Pulse Rate 96


 


Respiratory 16





Rate 


 


Blood Pressure 152/87





(mmHg) 


 


O2 Sat by Pulse 100





Oximetry 














- Intake and Output


Intake and Output: 


 Intake & Output











 05/13/18 05/14/18 05/15/18 05/16/18





 11:59 11:59 11:59 11:59


 


Intake Total   2083 549


 


Output Total   200 650


 


Balance   1883 -101


 


Weight   158 lb 


 


Intake:    


 


  IV Fluids   1623 15


 


    NS (0.9%)   1623 15


 


  IVPB    54


 


    ABX - LEVOFLOXACIN    54


 


  Oral   460 480


 


Output:    


 


  Urine   200 650


 


Other:    


 


  Estimated Void   Medium Medium


 


  # Bowel Movements   0 0


 


  Estimated Stool Amount    Medium


 


  # Voids   1 1





 





ADLs: Meal  Record                                         Start:  18 15:

41


Freq:   DAILY@0900,1400,1800                               Status: Active      

  


Protocol:                                                                      

  


 Document     18 22:13  [id number]  (Rec: 18 22:14  [id number]  TELE-C13)


 Document     05/15/18 09:00  [id number]  (Rec: 05/15/18 11:49  [id number]  TELE-C09)


 Document     05/15/18 13:39  [id number]  (Rec: 05/15/18 13:40  [id number]  TELE-C09)


 Document     05/15/18 18:00  [id number]  (Rec: 05/15/18 18:18  [id number]  TELE-C03)


Intake and Output                                          Start:  18 15:

41


Freq:   DAILY@0600,1400,2200                               Status: Active      

  


Protocol:                                                                      

  


 Document     18 22:14  [id number]  (Rec: 18 22:16  [id number]  TELE-C13)


 Document     05/15/18 06:00  [id number]  (Rec: 05/15/18 07:01  [id number]  TELE-C35)


 Document     05/15/18 14:00  [id number]  (Rec: 05/15/18 14:56  [id number]  TELE-C09)


 Document     05/15/18 21:31  [id number]  (Rec: 05/15/18 21:33  [id number]  TELE-C03)


 Document     18 06:00  [id number]  (Rec: 18 06:12  [id number]  TELE-C03)











- Physical Exam


General: No Cyanosis, No Anemia, No Jaundice, No Clubbing


Lungs and Chest: Yes: Chest Expansion Full, Chest Expansion Symetrica, 

Vessicular Breath Sounds - diminished right base, Crackles - right base, 

Wheezes.  No: Percussion Note Resonant - dull right base, Respiratory Distress, 

Use of Accessory Muscles


Heart Rate and Rhythm: Irregular


Additional Cardiovascular: Yes: Normal Heart Sounds.  No: Heart Murmur, Pedal 

Edema


Abdominal Exam: Yes: Soft, Bowel Sounds Present.  No: Distention, Abdominal Mass

, Abdominal Tenderness





- Neuro


Orientation: A/O x3


Speech: Normal





Results





- Results


Lab Results: 


 Laboratory Results - last 24 hr











  18





  05:40 05:40 05:49


 


WBC    15.2 H


 


RBC    3.85 L


 


Hgb    10.0 L


 


Hct    30 L


 


MCV    78 L


 


MCH    26 L


 


MCHC    33


 


RDW    17 H


 


Plt Count    287


 


MPV    8.8


 


Neut % (Auto)    71.0


 


Lymph % (Auto)    20.3 L


 


Mono % (Auto)    7.6 H


 


Eos % (Auto)    0.6


 


Baso % (Auto)    0.5


 


Absolute Neuts (auto)    10.8 H


 


Absolute Lymphs (auto)    3.1


 


Absolute Monos (auto)    1.1 H


 


Absolute Eos (auto)    0.1


 


Absolute Basos (auto)    0.1


 


Absolute Nucleated RBC    0


 


Nucleated RBC %    0.1


 


Sodium  132 L  


 


Potassium  3.9  


 


Chloride  105  


 


Carbon Dioxide  21 L  


 


Anion Gap  6  


 


BUN  15  


 


Creatinine  0.70  


 


Est GFR ( Amer)  102.0  


 


Est GFR (Non-Af Amer)  79.3  


 


BUN/Creatinine Ratio  21.4 H  


 


Glucose  104 H  


 


Calcium  9.0  


 


C-Reactive Protein  162.55 H  


 


Procalcitonin   0.9 H 











Radiology Results: 





Patient Name:         ZEN FLOOD                                         

                        Medical Record#: [id number]


Ordering Physician: Evan Naidu MD                                                

                        Acct.#: [id number]


:     1932         Age: 86   Sex: F                                   

                        Location: [address] [place] [address]/TELEMETRY


Exam Date: 05/15/18 0832                                                       

                        ADM Status: ADM IN


Order Information:                         CHEST PA & LAT 2 VWS


Accession Number:                          [id number]


CPT:                                       [id number]


INDICATION: Follow-up right-sided pneumonia





COMPARISON: Most recent comparison chest x-ray is dated May 14, 2018





TECHNIQUE: PA and lateral views of the chest were obtained.





FINDINGS:





The heart and mediastinum are normal in size and contour.





There is right lung base costophrenic angle blunting which is depicted better 

on the


lateral view chest x-ray. Again seen is questionable density overlying the 

right lower


lung but this is not well reproduced on the lateral view chest x-ray. More 

superiorly and


in the left lung the lungs are adequately aerated.





Visualized bones are normal for the patient's age.





There is no radiographic evidence of free air beneath the diaphragm





IMPRESSION: 


LIKELY LEFT-SIDED PLEURAL EFFUSION POSSIBLY WITH A SMALL AMOUNT OF ADJACENT 

COMPRESSIVE


ATELECTASIS. THERE IS NO DEFINITE LOBAR CONSOLIDATION.





____________________________________________________________


<Electronically signed by Mata Jones MD in OV>  05/15/18 1011


Dictated By: Mata Jones MD


Dictated Date/Time: 05/15/18 1011


Transcribed Date/Time: 05/15/18 0954


Copy to:











CC:Evan Naidu MD; Aicha Mejia DO


Imaging - [facility]                                 Imaging - [facility] 

[facility]                                     Imaging - [facility] 


[address]                                       [address] [address]


ph ([phone])                                     ph ([phone])        

                                        ph ([phone]) 




















                                                                    1 of 1








Assessment





- Problem List


Assessment: 


Patient Problems





Atrial fibrillation (Acute)


DVT prophylaxis (Acute)


Fall (Acute)


Full code status (Acute)


Head contusion (Acute)


Head trauma (Acute)


Pneumonia, community acquired (Acute)


Right lower lobe pneumonia (Acute)


Sepsis (Acute)


Sepsis (Acute)


GERD (gastroesophageal reflux disease) (Chronic)


HTN (hypertension) (Chronic)


History of CVA (cerebrovascular accident) (Chronic)


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








Plan: 





Pneumonia, community acquired (Acute)  She is recovering - the CRP, Neut%, WBC 

and procalcitonin are all moving in the right direction.  She continues to have 

a low grade fever and night sweats - she requires at least 2 more days of IV 

antibacterials. I note the physical signs are more dramatic than the CXR - but 

the right LL pneumonia is apparent on yesterday's CXR when I examined it.


Atrial fibrillation (Acute)  This is stable - I will discontinue telemetry


DVT prophylaxis (Acute)  she takes xarelto


Fall (Acute)  no adverse consequences - she continues to feel weak


Full code status (Acute)


Head contusion (Acute)  no problems


Head trauma (Acute)  no problems


Sepsis (Acute)  recovered





I discussed the above with the patient. She agrees to continued management 

plan. She is comfortable and stable.

## 2018-05-17 RX ADMIN — GUAIFENESIN SCH MG: 600 TABLET, EXTENDED RELEASE ORAL at 19:43

## 2018-05-17 RX ADMIN — ACETAMINOPHEN PRN MG: 325 TABLET ORAL at 23:18

## 2018-05-17 RX ADMIN — DOCUSATE SODIUM SCH MG: 100 CAPSULE, LIQUID FILLED ORAL at 09:17

## 2018-05-17 RX ADMIN — LEVOFLOXACIN SCH MLS/HR: 5 INJECTION, SOLUTION INTRAVENOUS at 11:48

## 2018-05-17 RX ADMIN — RIVAROXABAN SCH MG: 20 TABLET, FILM COATED ORAL at 09:17

## 2018-05-17 RX ADMIN — LOSARTAN POTASSIUM SCH MG: 25 TABLET, FILM COATED ORAL at 19:42

## 2018-05-17 RX ADMIN — DOCUSATE SODIUM SCH MG: 100 CAPSULE, LIQUID FILLED ORAL at 19:43

## 2018-05-17 RX ADMIN — GUAIFENESIN SCH MG: 600 TABLET, EXTENDED RELEASE ORAL at 09:17

## 2018-05-17 RX ADMIN — LOSARTAN POTASSIUM SCH MG: 25 TABLET, FILM COATED ORAL at 09:17

## 2018-05-17 NOTE — PN
Subjective





- Subjective


Reason for Note: Progress Note


History: 





She continues to cough, though this is less productive. She didn't have night 

sweats last night.  She has dyspnea on exertion  She is afebrile today.  Her 

appetite is improving - she had a BM this morning with a stool softener.  Her 

skin has an unusual sensation - not itchy, uncomfortable - this is new.  


Active Problems: 


 Active Problems





Atrial fibrillation (Acute) I48.91


  - Rate controlled - On Xarelto  


DVT prophylaxis (Acute) [id number]


  - on Xarelto  


Fall (Acute) 


Full code status (Acute) Z78.9


Head contusion (Acute) S00.93XA


Head trauma (Acute) S09.90XA


  - Secondary to fall at home 2 days prior to ED presentation - Residual right 

temple hematoma - stable, no neurologic symptoms  


Pneumonia, community acquired (Acute) J18.9


  - Appears worse clinically than diagnostic imaging. - Showing clinical 

improvement - Continue antibiotics and neb as needed  


Right lower lobe pneumonia (Acute) J18.1


Sepsis (Acute) 


Sepsis (Acute) 


  - Patient meets criteria for sepsis with leukocytosis, fever and tachypnea. - 

Continue IV antibiotics - Improving clinically - supportive care - Supplemental 

oxygen  


GERD (gastroesophageal reflux disease) (Chronic) K21.9


HTN (hypertension) (Chronic) I10


  - well controlled - continue Losartan  


History of CVA (cerebrovascular accident) (Chronic) Z86.73


  - Stable  


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 

Z98.89, Z86.79








Current Medications: 


 Current Medications





Acetaminophen (Tylenol Tab*)  650 mg PO Q4H PRN


   PRN Reason: FEVER/PAIN


   Last Admin: 05/14/18 15:38 Dose:  650 mg


Al Hydrox/Mg Hydrox/Simethicone (Maalox Plus*)  30 ml PO Q6H PRN


   PRN Reason: INDIGESTION


Albuterol (Ventolin 2.5 Mg/3 Ml Neb.Sol*)  2.5 mg INH [id number] AWAKE PRN


   PRN Reason: sob/wheezing


Docusate Sodium (Colace Cap*)  100 mg PO BID [facility]


   Last Admin: 05/16/18 19:51 Dose:  100 mg


Guaifenesin (Mucinex*)  600 mg PO BID [facility]


   Last Admin: 05/16/18 19:51 Dose:  600 mg


Levofloxacin/Dextrose (Levaquin 250 Mg Ivpremx(*))  250 mg in 50 mls @ 50 mls/

hr IVPB Q24H [facility]


   Last Admin: 05/16/18 12:10 Dose:  50 mls/hr


Losartan Potassium (Cozaar Tab*)  50 mg PO BID [facility]


   Last Admin: 05/16/18 19:50 Dose:  50 mg


Rivaroxaban (Xarelto(*))  20 mg PO DAILY [facility]


   Last Admin: 05/16/18 08:48 Dose:  20 mg








Home Medications: 


 Home Medications











 Medication  Instructions  Recorded  Confirmed  Type


 


Rivaroxaban TAB(*) [Xarelto (NF)] 20 mg PO DAILY 01/06/16 05/14/18 History


 


Losartan TAB* [Cozaar TAB*] 50 mg PO BID 04/07/16 05/14/18 History











Allergies: 


 Allergies











Allergy/AdvReac Type Severity Reaction Status Date / Time


 


erythromycin base Allergy  Nausea And Verified 05/07/18 23:32





   Vomiting  


 


Sulfa (Sulfonamide Allergy  Nausea And Verified 05/07/18 23:32





Antibiotics)   Vomiting  














Objective





- Vital Signs


Vital Signs: 


 Vital Signs











  05/16/18 05/16/18 05/16/18





  07:49 08:00 11:20


 


Temperature 98.2 F  98.6 F


 


Pulse Rate 92  90


 


Respiratory 20 18 20





Rate   


 


Blood Pressure 154/76  149/75





(mmHg)   


 


O2 Sat by Pulse 97  98





Oximetry   














  05/16/18 05/16/18 05/16/18





  15:18 19:30 20:00


 


Temperature 98.2 F 99.0 F 


 


Pulse Rate 83 86 


 


Respiratory 20 24 18





Rate   


 


Blood Pressure 144/74 136/67 





(mmHg)   


 


O2 Sat by Pulse 100 98 





Oximetry   














  05/16/18 05/17/18





  23:45 04:10


 


Temperature 99.2 F 98.3 F


 


Pulse Rate 81 82


 


Respiratory 20 20





Rate  


 


Blood Pressure 146/61 140/65





(mmHg)  


 


O2 Sat by Pulse 98 96





Oximetry  














- Intake and Output


Intake and Output: 


 Intake & Output











 05/14/18 05/15/18 05/16/18 05/17/18





 11:59 11:59 11:59 11:59


 


Intake Total  2083 729 980


 


Output Total  200 650 500


 


Balance  1883 79 480


 


Weight  158 lb  


 


Intake:    


 


  IV Fluids  1623 15 


 


    NS (0.9%)  1623 15 


 


  IVPB   54 


 


    ABX - LEVOFLOXACIN   54 


 


  Oral  460 660 980


 


Output:    


 


  Urine  200 650 500


 


Other:    


 


  Estimated Void  Medium Medium 


 


  # Bowel Movements  0 0 


 


  Estimated Stool Amount   Medium 


 


  # Voids  1 1 





 





ADLs: Meal  Record                                         Start:  05/14/18 15:

41


Freq:   DAILY@0900,1400,1800                               Status: Active      

  


Protocol:                                                                      

  


 Document     05/14/18 22:13  [id number]  (Rec: 05/14/18 22:14  [id number]  TELE-C13)


 Document     05/15/18 09:00  [id number]  (Rec: 05/15/18 11:49  [id number]  TELE-C09)


 Document     05/15/18 13:39  [id number]  (Rec: 05/15/18 13:40  [id number]  TELE-C09)


 Document     05/15/18 18:00  [id number]  (Rec: 05/15/18 18:18  [id number]  TELE-C03)


 Document     05/16/18 09:00  [id number]  (Rec: 05/16/18 14:22  [id number]  TELE-C10)


 Document     05/16/18 14:00  [id number]  (Rec: 05/16/18 14:27  [id number]  TELE-C10)


Intake and Output                                          Start:  05/14/18 15:

41


Freq:   DAILY@0600,1400,2200                               Status: Active      

  


Protocol:                                                                      

  


 Document     05/14/18 22:14  [id number]  (Rec: 05/14/18 22:16  [id number]  TELE-C13)


 Document     05/15/18 06:00  [id number]  (Rec: 05/15/18 07:01  [id number]  TELE-C35)


 Document     05/15/18 14:00  [id number]  (Rec: 05/15/18 14:56  [id number]  TELE-C09)


 Document     05/15/18 21:31  [id number]  (Rec: 05/15/18 21:33  [id number]  TELE-C03)


 Document     05/16/18 06:00  [id number]  (Rec: 05/16/18 06:12  [id number]  TELE-C03)


 Document     05/16/18 14:00  [id number]  (Rec: 05/16/18 14:27  [id number]  TELE-C10)


 Document     05/16/18 21:59  [id number]  (Rec: 05/16/18 22:00  [id number]  TELE-C08)


 Document     05/17/18 06:00  [id number]  (Rec: 05/17/18 06:16  [id number]  TELE-C32)











- Physical Exam


General: No Cyanosis, Yes Anemia, No Jaundice, No Clubbing


Lungs and Chest: Yes: Chest Expansion Full, Chest Expansion Symetrica, 

Percussion Note Resonant - dull right base, Vessicular Breath Sounds - 

diminished right base/some patchy bronchial breathing, Crackles, Wheezes.  No: 

Respiratory Distress, Use of Accessory Muscles


Heart Rate and Rhythm: Irregular


JVP: Not Elevated


Additional Cardiovascular: Yes: Normal Heart Sounds.  No: Heart Murmur, Pedal 

Edema


Abdominal Exam: Yes: Soft, Bowel Sounds Present.  No: Distention, Abdominal Mass

, Abdominal Tenderness





- Extremities


Cranial Nerves II-XII Intact: Yes


Limbs: Normal Power





- Neuro


Orientation: A/O x3


Speech: Normal





Assessment





- Problem List


Assessment: 


Patient Problems





Atrial fibrillation (Acute)


DVT prophylaxis (Acute)


Fall (Acute)


Full code status (Acute)


Head contusion (Acute)


Head trauma (Acute)


Pneumonia, community acquired (Acute)


Right lower lobe pneumonia (Acute)


Sepsis (Acute)


Sepsis (Acute)


GERD (gastroesophageal reflux disease) (Chronic)


HTN (hypertension) (Chronic)


History of CVA (cerebrovascular accident) (Chronic)


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








Plan: 





Her right lower lobe pneumonia is responding clinically to IV antibacterials.  

She is having some unusual, and slightly unpleasant skin sensations. This may 

be due to the levofloxacin.  I will wait before switching it out as she is 

responding so well and she is tolerating this sensation.  She will mobilize 

more today so she is ready for discharge tomorrow.


She remains in atrial fibrillation - her rate is well controlled.  She is 

hemodynamically stable.


Her appetite has returned and she has no bowel issues from the IV antibiotics.


I discussed the above with Romina who agrees with the plan.

## 2018-05-18 VITALS — SYSTOLIC BLOOD PRESSURE: 130 MMHG | DIASTOLIC BLOOD PRESSURE: 81 MMHG

## 2018-05-18 LAB
BASOPHILS # BLD AUTO: 0.1 10^3/UL (ref 0–0.2)
EOSINOPHIL # BLD AUTO: 0.2 10^3/UL (ref 0–0.6)
HCT VFR BLD AUTO: 27 % (ref 35–47)
HGB BLD-MCNC: 8.8 G/DL (ref 12–16)
LYMPHOCYTES # BLD AUTO: 3.5 10^3/UL (ref 1–4.8)
MCH RBC QN AUTO: 26 PG (ref 27–31)
MCHC RBC AUTO-ENTMCNC: 33 G/DL (ref 31–36)
MCV RBC AUTO: 77 FL (ref 80–97)
MONOCYTES # BLD AUTO: 1.4 10^3/UL (ref 0–0.8)
NEUTROPHILS # BLD AUTO: 8 10^3/UL (ref 1.5–7.7)
NRBC # BLD AUTO: 0 10^3/UL
NRBC BLD QL AUTO: 0
PLATELET # BLD AUTO: 305 10^3/UL (ref 150–450)
RBC # BLD AUTO: 3.45 10^6/UL (ref 4–5.4)
WBC # BLD AUTO: 13.2 10^3/UL (ref 3.5–10.8)

## 2018-05-18 RX ADMIN — DOCUSATE SODIUM SCH: 100 CAPSULE, LIQUID FILLED ORAL at 08:08

## 2018-05-18 RX ADMIN — GUAIFENESIN SCH MG: 600 TABLET, EXTENDED RELEASE ORAL at 08:09

## 2018-05-18 RX ADMIN — RIVAROXABAN SCH: 20 TABLET, FILM COATED ORAL at 07:58

## 2018-05-18 RX ADMIN — LOSARTAN POTASSIUM SCH MG: 25 TABLET, FILM COATED ORAL at 08:09

## 2018-05-18 NOTE — PN
Subjective





- Subjective


Reason for Note: Discharge Note


History: 





She feels improved. She had a paroxysmal coughing episode in the night that 

required some help - she calmed herself and was able to breath.  She continues 

to bring up sputum. She is afebrile.  She has no pain in her chest or 

elsewhere. She is able to walk independently


Active Problems: 


 Active Problems





Atrial fibrillation (Acute) I48.91


  - Rate controlled - On Xarelto  


DVT prophylaxis (Acute) [id number]


  - on Xarelto  


Fall (Acute) 


Full code status (Acute) Z78.9


Head contusion (Acute) S00.93XA


Head trauma (Acute) S09.90XA


  - Secondary to fall at home 2 days prior to ED presentation - Residual right 

temple hematoma - stable, no neurologic symptoms  


Pneumonia, community acquired (Acute) J18.9


  - Appears worse clinically than diagnostic imaging. - Showing clinical 

improvement - Continue antibiotics and neb as needed  


Right lower lobe pneumonia (Acute) J18.1


Sepsis (Acute) 


Sepsis (Acute) 


  - Patient meets criteria for sepsis with leukocytosis, fever and tachypnea. - 

Continue IV antibiotics - Improving clinically - supportive care - Supplemental 

oxygen  


GERD (gastroesophageal reflux disease) (Chronic) K21.9


HTN (hypertension) (Chronic) I10


  - well controlled - continue Losartan  


History of CVA (cerebrovascular accident) (Chronic) Z86.73


  - Stable  


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 

Z98.89, Z86.79








Current Medications: 


 Current Medications





Acetaminophen (Tylenol Tab*)  650 mg PO Q4H PRN


   PRN Reason: FEVER/PAIN


   Last Admin: 05/17/18 23:18 Dose:  650 mg


Al Hydrox/Mg Hydrox/Simethicone (Maalox Plus*)  30 ml PO Q6H PRN


   PRN Reason: INDIGESTION


Albuterol (Ventolin 2.5 Mg/3 Ml Neb.Sol*)  2.5 mg INH [id number] AWAKE PRN


   PRN Reason: sob/wheezing


Docusate Sodium (Colace Cap*)  100 mg PO BID [facility]


   Last Admin: 05/17/18 19:43 Dose:  100 mg


Guaifenesin (Mucinex*)  600 mg PO BID [facility]


   Last Admin: 05/17/18 19:43 Dose:  600 mg


Levofloxacin/Dextrose (Levaquin 250 Mg Ivpremx(*))  250 mg in 50 mls @ 50 mls/

hr IVPB Q24H [facility]


   Last Admin: 05/17/18 11:48 Dose:  50 mls/hr


Losartan Potassium (Cozaar Tab*)  50 mg PO BID [facility]


   Last Admin: 05/17/18 19:42 Dose:  50 mg


Rivaroxaban (Xarelto(*))  20 mg PO DAILY [facility]


   Last Admin: 05/17/18 09:17 Dose:  20 mg








Home Medications: 


 Home Medications











 Medication  Instructions  Recorded  Confirmed  Type


 


Rivaroxaban TAB(*) [Xarelto (NF)] 20 mg PO DAILY 01/06/16 05/14/18 History


 


Losartan TAB* [Cozaar TAB*] 50 mg PO BID 04/07/16 05/14/18 History


 


Levofloxacin TAB* [Levaquin 500 500 mg PO DAILY #7 tab 05/18/18  Rx





Tab*]    











Allergies: 


 Allergies











Allergy/AdvReac Type Severity Reaction Status Date / Time


 


erythromycin base Allergy  Nausea And Verified 05/07/18 23:32





   Vomiting  


 


Sulfa (Sulfonamide Allergy  Nausea And Verified 05/07/18 23:32





Antibiotics)   Vomiting  














Objective





- Vital Signs


Vital Signs: 


 Vital Signs











  05/17/18 05/17/18 05/17/18





  09:56 11:27 15:18


 


Temperature 98.8 F 98.8 F 98.9 F


 


Pulse Rate  86 79


 


Respiratory  20 20





Rate   


 


Blood Pressure  135/58 129/58





(mmHg)   


 


O2 Sat by Pulse  98 99





Oximetry   














  05/17/18 05/17/18 05/17/18





  19:26 19:28 23:17


 


Temperature 99.4 F  98.4 F


 


Pulse Rate 87  84


 


Respiratory 16 18 20





Rate   


 


Blood Pressure 141/58  156/75





(mmHg)   


 


O2 Sat by Pulse 98  95





Oximetry   














  05/18/18





  03:26


 


Temperature 98.4 F


 


Pulse Rate 74


 


Respiratory 20





Rate 


 


Blood Pressure 147/69





(mmHg) 


 


O2 Sat by Pulse 99





Oximetry 














- Intake and Output


Intake and Output: 


 Intake & Output











 05/15/18 05/16/18 05/17/18 05/18/18





 11:59 11:59 11:59 11:59


 


Intake Total 2083 729 1340 1918


 


Output Total 200 650 500 400


 


Balance 1883 79 840 1518


 


Weight 158 lb   


 


Intake:    


 


  IV Fluids 1623 15  20


 


    ABX - LEVOFLOXACIN    20


 


    NS (0.9%) 1623 15  


 


  IVPB  54  58


 


    ABX - LEVOFLOXACIN  54  58


 


  Oral  1840


 


Output:    


 


  Urine 200 650 500 400


 


Other:    


 


  Estimated Void Medium Medium  Medium


 


  # Bowel Movements 0 0  1


 


  Estimated Stool Amount  Medium  Small


 


  # Voids 1 1  3





 





ADLs: Meal  Record                                         Start:  05/14/18 15:

41


Freq:   DAILY@0900,1400,1800                               Status: Active      

  


Protocol:                                                                      

  


 Document     05/14/18 22:13  [id number]  (Rec: 05/14/18 22:14  [id number]  TELE-C13)


 Document     05/15/18 09:00  [id number]  (Rec: 05/15/18 11:49  [id number]  TELE-C09)


 Document     05/15/18 13:39  [id number]  (Rec: 05/15/18 13:40  [id number]  TELE-C09)


 Document     05/15/18 18:00  [id number]  (Rec: 05/15/18 18:18  [id number]  TELE-C03)


 Document     05/16/18 09:00  [id number]  (Rec: 05/16/18 14:22  [id number]  TELE-C10)


 Document     05/16/18 14:00  [id number]  (Rec: 05/16/18 14:27  [id number]  TELE-C10)


 Document     05/17/18 09:00  UHP3021  (Rec: 05/17/18 10:38  [id number]  TELE-C03)


 Document     05/17/18 13:38  [id number]  (Rec: 05/17/18 13:38  [id number]  MED-M21)


 Document     05/17/18 18:00  [id number]  (Rec: 05/17/18 19:52  [id number]  TELE-C03)


Intake and Output                                          Start:  05/14/18 15:

41


Freq:   DAILY@0600,1400,2200                               Status: Active      

  


Protocol:                                                                      

  


 Document     05/14/18 22:14  [id number]  (Rec: 05/14/18 22:16  [id number]  TELE-C13)


 Document     05/15/18 06:00  [id number]  (Rec: 05/15/18 07:01  [id number]  TELE-C35)


 Document     05/15/18 14:00  [id number]  (Rec: 05/15/18 14:56  [id number]  TELE-C09)


 Document     05/15/18 21:31  [id number]  (Rec: 05/15/18 21:33  [id number]  TELE-C03)


 Document     05/16/18 06:00  [id number]  (Rec: 05/16/18 06:12  [id number]  TELE-C03)


 Document     05/16/18 14:00  [id number]  (Rec: 05/16/18 14:27  [id number]  TELE-C10)


 Document     05/16/18 21:59  [id number]  (Rec: 05/16/18 22:00  [id number]  TELE-C08)


 Document     05/17/18 06:00  [id number]  (Rec: 05/17/18 06:16  [id number]  TELE-C32)


 Document     05/17/18 14:00  [id number]  (Rec: 05/17/18 15:12  [id number]  TELE-C03)


 Document     05/17/18 22:00  [id number]  (Rec: 05/17/18 22:34  [id number]  TELE-C03)


 Document     05/17/18 22:47  [id number]  (Rec: 05/17/18 22:48  [id number]  TELE-C02)


 Document     05/18/18 06:00  TZE9632  (Rec: 05/18/18 06:09  [id number]  TELE-C34)


 Document     05/18/18 06:46  [id number]  (Rec: 05/18/18 06:46  [id number]  TELE-C06)











- Physical Exam


General: Yes Cyanosis, Yes Anemia, Yes Jaundice, Yes Clubbing


Lungs and Chest: Yes: Chest Expansion Full, Chest Expansion Symetrica, 

Percussion Note Resonant, Crackles - right base.  No: Vessicular Breath Sounds 

- decreased right lobe, Wheezes, Respiratory Distress, Use of Accessory Muscles


Heart Rate and Rhythm: Regular


Additional Cardiovascular: Yes: Normal Heart Sounds.  No: Heart Murmur, Pedal 

Edema


Abdominal Exam: Yes: Soft, Bowel Sounds Present.  No: Distention, Abdominal Mass

, Abdominal Tenderness





Results





- Results


Lab Results: 


 Laboratory Results - last 24 hr











  05/18/18 05/18/18





  05:50 05:50


 


WBC  13.2 H 


 


RBC  3.45 L 


 


Hgb  8.8 L 


 


Hct  27 L 


 


MCV  77 L 


 


MCH  26 L 


 


MCHC  33 


 


RDW  17 H 


 


Plt Count  305 


 


MPV  8.6 


 


Neut % (Auto)  60.8 


 


Lymph % (Auto)  26.5 


 


Mono % (Auto)  10.8 H 


 


Eos % (Auto)  1.2 


 


Baso % (Auto)  0.7 


 


Absolute Neuts (auto)  8.0 H 


 


Absolute Lymphs (auto)  3.5 


 


Absolute Monos (auto)  1.4 H 


 


Absolute Eos (auto)  0.2 


 


Absolute Basos (auto)  0.1 


 


Absolute Nucleated RBC  0 


 


Nucleated RBC %  0 


 


Sodium   136 L


 


Potassium   3.8


 


Chloride   108


 


Carbon Dioxide   22


 


Anion Gap   6


 


BUN   15


 


Creatinine   0.72


 


Est GFR ( Amer)   98.8


 


Est GFR (Non-Af Amer)   76.8


 


BUN/Creatinine Ratio   20.8 H


 


Glucose   98


 


Calcium   8.8


 


C-Reactive Protein   97.15 H














Assessment





- Problem List


Assessment: 


Patient Problems





Atrial fibrillation (Acute)


DVT prophylaxis (Acute)


Fall (Acute)


Full code status (Acute)


Head contusion (Acute)


Head trauma (Acute)


Pneumonia, community acquired (Acute)


Right lower lobe pneumonia (Acute)


Sepsis (Acute)


Sepsis (Acute)


GERD (gastroesophageal reflux disease) (Chronic)


HTN (hypertension) (Chronic)


History of CVA (cerebrovascular accident) (Chronic)


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








Plan: 





She is feeling much improved.  I have changed her to levofloxacin 500 mg po 

qdaily.  She is ready for discharge.

## 2018-05-20 NOTE — DS
DISCHARGE SUMMARY:

 

DATE OF ADMISSION:  05/14/18

 

DATE OF DISCHARGE:  05/18/18

 

DISCHARGE DIAGNOSES:

1.  Right lower lobe pneumonia.

2.  Fall, weakness.

 

SECONDARY DIAGNOSES:

1.  Atrial fibrillation.

2.  Anticoagulation.

3.  History of cerebrovascular accident.

4.  Essential hypertension.

5.  Gastroesophageal reflux disease.

 

Other acute problems: Sepsis, right head contusion.

 

HISTORY OF PRESENT ILLNESS:  Romina Arroyo is an 86-year-old white female, 
her presentation is documented in [facility]'s admitting history and 
physical.  In short, she had a recent cough, which was productive and shortness 
of breath.  She had been into my outpatient office, was treated conservatively.
  She developed progressive shortness of breath, became lightheaded, fell in 
the bathroom at the time, struck the right side of the head, was able to get 
herself up independently.  She presented with increasing weakness and shortness 
of breath.

 

PHYSICAL EXAMINATION AT PRESENTATION:  Blood pressure 120/56, pulse 89, 
temperature 98.6, O2 saturation on room air, she had a bruise, right forehead.  
Chest: Scattered expiratory rhonchi, more profound at the bases.  No chest wall 
retraction, accessory muscle use.  Cardiovascular System:  Benign.  Abdomen: 
Benign.  Nervous System:  Grossly intact.

 

INITIAL INVESTIGATIONS:  White blood count 21.6, hemoglobin 11.4, hematocrit 35
, platelets 270, INR 2.4.  Sodium 130, potassium 4.4, chloride 97, bicarb 21, 
BUN 18, creatinine 0.8. , , lipase 10.  Negative influenza 
studies.

 

IMAGING:  Chest x-ray is consistent with right lower lobe infiltrate of the 
lung. Brain CT, no intracranial hemorrhage or pathology.

 

INITIAL IMPRESSION:

1.  Sepsis, raised white count, high CRP, tachypnea.  She was admitted for IV 
hydration, IV antibiotics, put on telemetry, given supplemental oxygen.

2.  Syncope thought to be secondary to her pneumonia.

 

INVESTIGATION:  Neutrophil percent at presentation was 85%, this went down to 
60.8 on the day of discharge.  CRP, this came down rapidly and was 97.15 on the 
day of discharge.  Urine antigen for Pneumococcus and also for Legionella was 
negative, all other microbiology was negative.

 

HOSPITAL COURSE:  When I saw her for the first time on 05/15/18, she had a 
classical physical signs of a right lower lobe pneumonia with dullness to 
percussion, bronchial breathing.  She responded rapidly to treatments with 
levofloxacin.

 

Her sepsis rapidly resolved.

 

On the day of discharge, she continued to have a cough, bring up some phlegm, 
which is occasionally blood stained.  She had no chest pain.  No dyspnea at rest
, was able to breathe on room air without hypoxemia.  She has no more fevers or 
night sweats.  She felt a little weak, but her appetite was good and she did 
not have any diarrhea resulting from her treatment.

 

PHYSICAL EXAMINATION ON THE DAY OF DISCHARGE:  Temperature 98.4, pulse 74, 
respirations 20, blood pressure 147/69, oxygen saturation on room air 99%.  She 
had no cyanosis, anemia, jaundice, clubbing, or lymphadenopathy.  
Cardiovascular System:  Irregularly irregular pulse.  Venous pressure not 
elevated.  Heart sounds normal.  No added sounds or murmurs.  No pedal edema.  
Respiratory System:  Chest expansion full and symmetrical percussion.  No dull 
right dull base, though improved from previous stay.  Breath sounds from 
decreased air entry, a few crackles and some patchy bronchial breathing, right 
base.  Not using accessory muscles of respiration.  No respiratory distress.  
Abdomen was soft and benign. Bowel sounds present.  She is alert and oriented.  
Normal speech.

 

ASSESSMENT AND PLAN:

1.  Right lower lobe pneumonia.  I changed her over to oral levofloxacin 500 mg 
daily.  Clearly, she is improving on this and its blood levels will be similar 
by mouth as to the parenteral delivery.  I am placing her on a week's worth of 
treatment to ensure complete resolution of this pneumonia.  I have explained to 
the patient and her  that it may take several weeks for her to feel 
fully back to baseline.

2.  Atrial fibrillation.  She is permanently in atrial fibrillation.  She 
should continue on the Xarelto.

3.  With contusion to her right forehead, she had no intracranial disaster and 
the bruise is healing.

 

Other comorbidities:  GERD, hypertension are all well controlled.

 

DISCHARGE MEDICATIONS:

1.  Levofloxacin 500 mg daily for 7 days.

2.  Rivaroxaban 20 mg daily.

3.  Losartan 50 mg twice daily.

 

FOLLOWUP:  She will follow up in my office within few days.

 

 [id number]/CPS #: [id number]

RILEY

## 2019-11-28 ENCOUNTER — HOSPITAL ENCOUNTER (EMERGENCY)
Dept: HOSPITAL 25 - ED | Age: 84
Discharge: HOME | End: 2019-11-28
Payer: MEDICARE

## 2019-11-28 VITALS — DIASTOLIC BLOOD PRESSURE: 72 MMHG | SYSTOLIC BLOOD PRESSURE: 159 MMHG

## 2019-11-28 DIAGNOSIS — D64.9: ICD-10-CM

## 2019-11-28 DIAGNOSIS — Z88.2: ICD-10-CM

## 2019-11-28 DIAGNOSIS — I48.91: ICD-10-CM

## 2019-11-28 DIAGNOSIS — K21.9: ICD-10-CM

## 2019-11-28 DIAGNOSIS — Z86.73: ICD-10-CM

## 2019-11-28 DIAGNOSIS — Z79.899: ICD-10-CM

## 2019-11-28 DIAGNOSIS — Z79.01: ICD-10-CM

## 2019-11-28 DIAGNOSIS — Z90.710: ICD-10-CM

## 2019-11-28 DIAGNOSIS — Z87.891: ICD-10-CM

## 2019-11-28 DIAGNOSIS — Z88.1: ICD-10-CM

## 2019-11-28 DIAGNOSIS — R42: Primary | ICD-10-CM

## 2019-11-28 DIAGNOSIS — I10: ICD-10-CM

## 2019-11-28 LAB
ALBUMIN SERPL BCG-MCNC: 4 G/DL (ref 3.2–5.2)
ALBUMIN/GLOB SERPL: 1.6 {RATIO} (ref 1–3)
ALP SERPL-CCNC: 117 U/L (ref 34–104)
ALT SERPL W P-5'-P-CCNC: 15 U/L (ref 7–52)
ANION GAP SERPL CALC-SCNC: 12 MMOL/L (ref 2–11)
AST SERPL-CCNC: 16 U/L (ref 13–39)
BASOPHILS # BLD AUTO: 0.1 10^3/UL (ref 0–0.2)
BUN SERPL-MCNC: 23 MG/DL (ref 6–24)
BUN/CREAT SERPL: 23 (ref 8–20)
CALCIUM SERPL-MCNC: 9.4 MG/DL (ref 8.6–10.3)
CHLORIDE SERPL-SCNC: 104 MMOL/L (ref 101–111)
EOSINOPHIL # BLD AUTO: 0.1 10^3/UL (ref 0–0.6)
GLOBULIN SER CALC-MCNC: 2.5 G/DL (ref 2–4)
GLUCOSE SERPL-MCNC: 126 MG/DL (ref 70–100)
HCO3 SERPL-SCNC: 21 MMOL/L (ref 22–32)
HCT VFR BLD AUTO: 38 % (ref 35–47)
HGB BLD-MCNC: 12.7 G/DL (ref 12–16)
LYMPHOCYTES # BLD AUTO: 4.3 10^3/UL (ref 1–4.8)
MCH RBC QN AUTO: 28 PG (ref 27–31)
MCHC RBC AUTO-ENTMCNC: 33 G/DL (ref 31–36)
MCV RBC AUTO: 84 FL (ref 80–97)
MONOCYTES # BLD AUTO: 0.8 10^3/UL (ref 0–0.8)
NEUTROPHILS # BLD AUTO: 7.8 10^3/UL (ref 1.5–7.7)
NRBC # BLD AUTO: 0 10^3/UL
NRBC BLD QL AUTO: 0
PLATELET # BLD AUTO: 265 10^3/UL (ref 150–450)
POTASSIUM SERPL-SCNC: 4.1 MMOL/L (ref 3.5–5)
PROT SERPL-MCNC: 6.5 G/DL (ref 6.4–8.9)
RBC # BLD AUTO: 4.56 10^6 /UL (ref 3.7–4.87)
SODIUM SERPL-SCNC: 137 MMOL/L (ref 135–145)
TROPONIN I SERPL-MCNC: 0.01 NG/ML (ref ?–0.03)
WBC # BLD AUTO: 13.1 10^3/UL (ref 3.5–10.8)

## 2019-11-28 PROCEDURE — 84484 ASSAY OF TROPONIN QUANT: CPT

## 2019-11-28 PROCEDURE — 85025 COMPLETE CBC W/AUTO DIFF WBC: CPT

## 2019-11-28 PROCEDURE — 99283 EMERGENCY DEPT VISIT LOW MDM: CPT

## 2019-11-28 PROCEDURE — 71046 X-RAY EXAM CHEST 2 VIEWS: CPT

## 2019-11-28 PROCEDURE — 36415 COLL VENOUS BLD VENIPUNCTURE: CPT

## 2019-11-28 PROCEDURE — 80053 COMPREHEN METABOLIC PANEL: CPT

## 2019-11-28 PROCEDURE — 93005 ELECTROCARDIOGRAM TRACING: CPT

## 2019-11-28 NOTE — ED
Syncope/Near Syncope





- HPI Summary


HPI Summary: 


This patient is an 87 year old F with a history of a-fib and HTN presenting to 

ED with a chief complaint of near-syncopal episode at 1900 today. Patient had 

finished eating dinner and was smoking a marijuana cigarette. The last time she 

had the marijuana cigarettes was ten years ago. Per EMS, her friends stated 

that the patient was staring off into space and not responding. At the time, 

patient said she felt lightheaded and out of it. She denies chest pain and 

headache. Patient reports feeling nauseous and she received 4mg IV Zofran by 

EMS. Patient is taking Xarelto for her a-fib and Losartan for HTN. The patient 

rates the pain 0/10 in severity. Symptoms aggravated by nothing. Symptoms 

alleviated by nothing.





- History Of Current Complaint


Chief Complaint: EDSyncope


Time Seen by Provider: 11/28/19 20:05


Hx Obtained From: Patient


Onset/Duration: Sudden Onset, Resolved


Timing: Constant


Context: Witnessed


Activity At Onset: Other - Smoking a marijuana cigarette


Aggravating Factor(s): Nothing


Alleviating Factor(s): Nothing


Associated Signs And Symptoms: Negative - Chest pain, Lightheadedness, Other - 

Nausea





- Allergies/Home Medications


Allergies/Adverse Reactions: 


 Allergies











Allergy/AdvReac Type Severity Reaction Status Date / Time


 


erythromycin base Allergy  Nausea And Verified 11/28/19 20:03





   Vomiting  


 


Sulfa (Sulfonamide Allergy  Nausea And Verified 11/28/19 20:03





Antibiotics)   Vomiting  











Home Medications: 


 Home Medications





Losartan TAB* [Cozaar TAB*] 25 mg PO QPM 11/28/19 [History Confirmed 11/28/19]











PMH/Surg Hx/FS Hx/Imm Hx


Endocrine/Hematology History: Reports: Hx Anemia


   Denies: Hx Diabetes, Hx Thyroid Disease


Cardiovascular History: Reports: Hx Hypertension


   Comment Only: Other Cardiovascular Problems/Disorders - TACHYCARDIA


Respiratory History: 


   Denies: Hx Asthma, Hx Chronic Obstructive Pulmonary Disease (COPD)


GI History: Reports: Hx Gastroesophageal Reflux Disease, Hx Jaundice, Other GI 

Disorders - morning nausea r/t medications


   Denies: Hx Ulcer


Sensory History: Reports: Hx Contacts or Glasses - reading


   Denies: Hx Hearing Aid


Opthamlomology History: Reports: Hx Contacts or Glasses - reading


Neurological History: Reports: Hx Transient Ischemic Attacks (TIA) - 2004





- Surgical History


Surgery Procedure, Year, and Place: hysterectomy.  laparoscopic surgeries for 

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  abcessed ovary-removed.  surgery for a fib


Infectious Disease History: No


Infectious Disease History: 


   Denies: Hx Clostridium Difficile, Hx Hepatitis, Hx Human Immunodeficiency 

Virus (HIV), Hx of Known/Suspected MRSA, Hx Shingles, Hx Tuberculosis, Hx Known/

Suspected VRE, Hx Known/Suspected VRSA, History Other Infectious Disease, 

Traveled Outside the [place] in Last 30 Days





- Family History


Known Family History: Positive: Cardiac Disease





- Social History


Alcohol Use: Daily


Alcohol Amount: one glass


Hx Substance Use: Yes


Substance Use Type: Reports: Marijuana


Substance Use Comment - Amount & Last Used: Vaporized THC


Hx Tobacco Use: Yes


Smoking Status (MU): Former Smoker


Type: Cigarettes


Amount Used/How Often: 40 years ago


Have You Smoked in the Last Year: No





Review of Systems


Negative: Chest Pain


Positive: Nausea


Neurological: Other - Lightheadedness


Positive: Syncope - Near-syncope.  Negative: Headache


All Other Systems Reviewed And Are Negative: Yes





Physical Exam





- Summary


Physical Exam Summary: 


Constitutional: Well-developed, Well-nourished, Alert. (-) Distressed


Skin: Warm, Dry


HENT: Normocephalic; Atraumatic


Eyes: Conjunctiva normal


Neck: Musculoskeletal ROM normal neck. (-) JVD, (-) Stridor, (-) Nuchal rigidity


Cardio: Irregularly irregular heart rate; Intact distal pulses; Radial pulses 

are 2+ and symmetric. (-) Murmur


Pulmonary/Chest wall: Effort normal. (-) Respiratory distress, (-) Wheezes, (-) 

Rales


Abd: Soft, (-) tenderness, (-) Distension, (-) Guarding, (-) Rebound


Musculoskeletal: (-) Edema


Lymph: (-) Cervical adenopathy


Neuro: Alert, Oriented x3


Psych: Mood and affect Normal


Triage Information Reviewed: Yes


Vital Signs On Initial Exam: 


 Initial Vitals











Temp Pulse Resp BP Pulse Ox


 


 98.1 F   92   16   163/87   94 


 


 11/28/19 19:58  11/28/19 19:58  11/28/19 19:58  11/28/19 19:58  11/28/19 19:58











Vital Signs Reviewed: Yes





Procedures





- Sedation


Patient Received Moderate/Deep Sedation with Procedure: No





Diagnostics





- Vital Signs


 Vital Signs











  Temp Pulse Resp BP Pulse Ox


 


 11/28/19 19:58  98.1 F  92  16  163/87  94














- Laboratory


Result Diagrams: 


 11/28/19 20:32





 11/28/19 20:32


Lab Statement: Any lab studies that have been ordered have been reviewed, and 

results considered in the medical decision making process.





- Radiology


  ** CXR


Radiology Interpretation Completed By: ED Physician


Summary of Radiographic Findings: No acute abnormality.  Pending official 

radiology report.





- EKG


  ** 2008


Cardiac Rate: NL - 86 BPM


EKG Rhythm: Atrial Fibrillation


Summary of EKG Findings: An EKG at 2008 revealed atrial fibrillation at 86 BPM, 

no STEMI, no significant change from previous EKG taken 5/15/18. Dr. Miller 

has reviewed and interpreted this EKG.





Re-Evaluation





- Re-Evaluation


  ** First Eval


Re-Evaluation Time: 21:53


Comment: Discussed results with patient. Patient will be discharged home with 

dx of lightheadedness. Ambulated in ED. Feels better. Patient understands and 

agrees with this plan.





Course/Dx


Course Of Treatment: 88 y/o F w hx afib p/w lightheadedness after smoking 

marijuana.  - VSS, afib on EKG.  Syncope.  DDx:  Suspect 2/2 smoking marijuana.

  Also:  Seizure: no witnessed seizure activity, incontinence or h/o seizures 

to suggest seizure today.  Hypoxia and hypoglycemia less likely in this patient 

with normal sats and BG.  Cardiac issue: electrical (dysarrhythmias, brugada, 

WPW, long QT). Less likely given no evidence of drop attack, no palpitations, 

no EKG findings to predispose to arrhythmias. No CP, no STEMI on EKG; NSTEMI 

unlikely to cause brief cardiogenic shock in absence of other significant 

findings, so likely does not need full cardiac rule out with troponins and 

stress.  Vessels: PE, dissection, AAA. Clinical picture inconsistent, no abd 

pain, no pulsatile mass, symmetric pulses, no risk factors of PE.  Volume issue

: No signs of dehydratoin. No recent illness to suggest infection, no e/o 

anemia by history or PE.  Neuro: No HA, mnl neuro exam





- Diagnoses


Provider Diagnoses: 


 Lightheadedness








Discharge ED





- Sign-Out/Discharge


Documenting (check all that apply): Patient Departure - Discharge





- Discharge Plan


Condition: Stable


Disposition: HOME


Patient Education Materials:  Lightheadedness (ED)


Referrals: 


Evan Naidu MD [Primary Care Provider] - 


Additional Instructions: 


You were seen in the emergency department for lightheadedness. Your labwork did 

not show a cause for this. Your EKG showed atrial fibrillation.


If any studies were not completed at the time of discharge you will be called 

with the relevant results.


Please follow up with your primary care doctor in next 2-3 days and return to 

emergency department for chest pain, passing out, trouble breathing or 

concerning symptoms.


It was a pleasure taking care of you today.





- Billing Disposition and Condition


Condition: STABLE


Disposition: Home





- Attestation Statements


Document Initiated by Katherine: Yes


Documenting Scribe: Porter Green


Provider For Whom Katherine is Documenting (Include Credential): Lizandro Miller MD


Scribe Attestation: 


I, Porter Green, scribed for Lizandro Miller MD on 11/28/19 at 2152. 


Scribe Documentation Reviewed: Yes


Provider Attestation: 


The documentation as recorded by the Porter hamilton accurately reflects 

the service I personally performed and the decisions made by me, Lizandro Miller MD


Status of Scribe Document: Viewed